# Patient Record
Sex: FEMALE | Race: WHITE | NOT HISPANIC OR LATINO | Employment: FULL TIME | URBAN - METROPOLITAN AREA
[De-identification: names, ages, dates, MRNs, and addresses within clinical notes are randomized per-mention and may not be internally consistent; named-entity substitution may affect disease eponyms.]

---

## 2022-04-21 LAB
EXTERNAL HIV CONFIRMATION: NORMAL
EXTERNAL HIV SCREEN: NORMAL
HCV AB SER-ACNC: NONREACTIVE

## 2022-08-18 ENCOUNTER — OCCMED (OUTPATIENT)
Dept: URGENT CARE | Facility: CLINIC | Age: 22
End: 2022-08-18

## 2022-08-18 ENCOUNTER — APPOINTMENT (OUTPATIENT)
Dept: LAB | Facility: CLINIC | Age: 22
End: 2022-08-18

## 2022-08-18 DIAGNOSIS — Z00.00 ENCOUNTER FOR PHYSICAL EXAMINATION: ICD-10-CM

## 2022-08-18 DIAGNOSIS — Z00.00 ENCOUNTER FOR PHYSICAL EXAMINATION: Primary | ICD-10-CM

## 2022-08-18 LAB — RUBV IGG SERPL IA-ACNC: 53.6 IU/ML

## 2022-08-18 PROCEDURE — 86735 MUMPS ANTIBODY: CPT

## 2022-08-18 PROCEDURE — 36415 COLL VENOUS BLD VENIPUNCTURE: CPT

## 2022-08-18 PROCEDURE — 86480 TB TEST CELL IMMUN MEASURE: CPT

## 2022-08-18 PROCEDURE — 86765 RUBEOLA ANTIBODY: CPT

## 2022-08-18 PROCEDURE — 86787 VARICELLA-ZOSTER ANTIBODY: CPT

## 2022-08-18 PROCEDURE — 86762 RUBELLA ANTIBODY: CPT

## 2022-08-19 LAB
MEV IGG SER QL IA: NORMAL
MUV IGG SER QL IA: NORMAL
VZV IGG SER QL IA: NORMAL

## 2022-08-21 LAB
GAMMA INTERFERON BACKGROUND BLD IA-ACNC: 0.04 IU/ML
M TB IFN-G BLD-IMP: NEGATIVE
M TB IFN-G CD4+ BCKGRND COR BLD-ACNC: 0 IU/ML
M TB IFN-G CD4+ BCKGRND COR BLD-ACNC: 0 IU/ML
MITOGEN IGNF BCKGRD COR BLD-ACNC: >10 IU/ML

## 2022-12-21 ENCOUNTER — OFFICE VISIT (OUTPATIENT)
Dept: FAMILY MEDICINE CLINIC | Facility: CLINIC | Age: 22
End: 2022-12-21

## 2022-12-21 VITALS
WEIGHT: 119 LBS | HEIGHT: 62 IN | SYSTOLIC BLOOD PRESSURE: 118 MMHG | HEART RATE: 73 BPM | RESPIRATION RATE: 14 BRPM | BODY MASS INDEX: 21.9 KG/M2 | DIASTOLIC BLOOD PRESSURE: 74 MMHG | TEMPERATURE: 98.7 F

## 2022-12-21 DIAGNOSIS — Z00.00 ANNUAL PHYSICAL EXAM: Primary | ICD-10-CM

## 2022-12-21 DIAGNOSIS — F41.9 ANXIETY: ICD-10-CM

## 2022-12-21 DIAGNOSIS — F32.A MILD DEPRESSION: ICD-10-CM

## 2022-12-21 PROBLEM — Z78.9 VEGAN DIET: Status: ACTIVE | Noted: 2022-12-21

## 2022-12-21 RX ORDER — IBUPROFEN 200 MG
400 TABLET ORAL
COMMUNITY

## 2022-12-21 RX ORDER — ETONOGESTREL 68 MG/1
68 IMPLANT SUBCUTANEOUS
COMMUNITY

## 2022-12-21 RX ORDER — ESCITALOPRAM OXALATE 10 MG/1
10 TABLET ORAL DAILY
Qty: 90 TABLET | Refills: 1 | Status: SHIPPED | OUTPATIENT
Start: 2022-12-21

## 2022-12-21 RX ORDER — CEPHALEXIN 500 MG/1
500 CAPSULE ORAL
COMMUNITY
Start: 2022-07-29 | End: 2022-12-21 | Stop reason: ALTCHOICE

## 2022-12-21 RX ORDER — MELOXICAM 7.5 MG/1
7.5 TABLET ORAL
COMMUNITY
Start: 2022-08-02 | End: 2022-12-21 | Stop reason: ALTCHOICE

## 2022-12-21 NOTE — ASSESSMENT & PLAN NOTE
Has history   Currently on zoloft; feels this is not helping her overall anxiety symptoms  NO SI  Continue therapy  Med changed as above

## 2022-12-21 NOTE — PROGRESS NOTES
[facility] PRIMARY CARE    NAME: Svetlana Alston  AGE: 20 y o  SEX: female  : 2000     DATE: 2022     Assessment and Plan:     Problem List Items Addressed This Visit        Other    Anxiety     Does see a therapist currently  She was on zoloft but feels this isnt working anymore          Relevant Medications    escitalopram (Lexapro) 10 mg tablet    Mild depression     Has history   Currently on zoloft; feels this is not helping her overall anxiety symptoms  NO SI  Continue therapy  Med changed as above  Relevant Medications    escitalopram (Lexapro) 10 mg tablet   Other Visit Diagnoses     Annual physical exam    -  Primary          Immunizations and preventive care screenings were discussed with patient today  Appropriate education was printed on patient's after visit summary  Counseling:  Alcohol/drug use: discussed moderation in alcohol intake, the recommendations for healthy alcohol use, and avoidance of illicit drug use  Dental Health: discussed importance of regular tooth brushing, flossing, and dental visits  Injury prevention: discussed safety/seat belts, safety helmets, smoke detectors, carbon dioxide detectors, and smoking near bedding or upholstery  Sexual health: discussed sexually transmitted diseases, partner selection, use of condoms, avoidance of unintended pregnancy, and contraceptive alternatives  Exercise: the importance of regular exercise/physical activity was discussed  Recommend exercise 3-5 times per week for at least 30 minutes  Return in about 6 weeks (around 2023) for Anxiety  Chief Complaint:     Chief Complaint   Patient presents with   • Physical Exam     Patient in office for annual check up and establish care  Patient will like to discuss dose change for the zoloft medication   Patient reports original dose was 100mg , patient decrease dose to 75mg due to side effect sxs of nausea and vomiting  History of Present Illness:     Adult Annual Physical   Patient here for a comprehensive physical exam  The patient reports problems - zoloft medication  was on to high of a dose so she decreased down to 75mg  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  She was hired in September enjoys her job     She has been on Zoloft for about 2 years  She was taking 75mg and then was recently increased to 100 in July and she was having side effects with nausea and vomiting  She had blood work done no other cause  Was still taking it for a month  She moved and didn't follow up and she reduced her dose down to 75mg  She states she also doesn't feel the 76 is working either  She was being seen at [facility] in [place]  She is in therapy as well  Diet and Physical Activity  Diet/Nutrition: well balanced diet and she is vegan      Exercise: 3-4 times a week on average, 5-7 times a week on average, 30-60 minutes on average and 1-2 hours on average  Depression Screening  PHQ-2/9 Depression Screening    Little interest or pleasure in doing things: 2 - more than half the days  Feeling down, depressed, or hopeless: 1 - several days  Trouble falling or staying asleep, or sleeping too much: 0 - not at all  Feeling tired or having little energy: 2 - more than half the days  Poor appetite or overeatin - not at all  Feeling bad about yourself - or that you are a failure or have let yourself or your family down: 0 - not at all  Trouble concentrating on things, such as reading the newspaper or watching television: 0 - not at all  Moving or speaking so slowly that other people could have noticed   Or the opposite - being so fidgety or restless that you have been moving around a lot more than usual: 3 - nearly every day  Thoughts that you would be better off dead, or of hurting yourself in some way: 0 - not at all  PHQ-2 Score: 3  PHQ-2 Interpretation: POSITIVE depression screen  PHQ-9 Score: 8   PHQ-9 Interpretation: Mild depression        General Health  Sleep: sleeps well  Hearing: normal - bilateral   Vision: goes for regular eye exams, most recent eye exam <1 year ago, wears glasses and wears contacts  Dental: regular dental visits and brushes teeth twice daily  /GYN Health  Patient is: premenopausal  Last menstrual period: 12/21/2022  Contraceptive method: nexplanonon        Review of Systems:     Review of Systems   Constitutional: Negative for appetite change  HENT: Negative for hearing loss  Eyes: Negative for visual disturbance  Respiratory: Negative for chest tightness and shortness of breath  Cardiovascular: Negative for chest pain and palpitations  Gastrointestinal: Positive for nausea and vomiting  Genitourinary: Negative for menstrual problem (cycle is long has BC)  Neurological: Negative for dizziness, light-headedness and headaches  Psychiatric/Behavioral: Positive for dysphoric mood (mild)  Negative for decreased concentration, sleep disturbance and suicidal ideas  The patient is nervous/anxious  Past Medical History:     History reviewed  No pertinent past medical history  Past Surgical History:     History reviewed  No pertinent surgical history  Social History:     Social History     Socioeconomic History   • Marital status: Single     Spouse name: None   • Number of children: None   • Years of education: None   • Highest education level: None   Occupational History   • None   Tobacco Use   • Smoking status: Never   • Smokeless tobacco: Never   Substance and Sexual Activity   • Alcohol use:  Yes     Alcohol/week: 1 0 standard drink     Types: 1 Glasses of wine per week     Comment: Socially   • Drug use: Yes     Types: Marijuana     Comment: Medicinal   • Sexual activity: Not Currently   Other Topics Concern   • None   Social History Narrative   • None     Social Determinants of Health     Financial Resource Strain: Not on file   Food Insecurity: Not on file   Transportation Needs: Not on file   Physical Activity: Not on file   Stress: Not on file   Social Connections: Not on file   Intimate Partner Violence: Not on file   Housing Stability: Not on file      Family History:     History reviewed  No pertinent family history  Current Medications:     Current Outpatient Medications   Medication Sig Dispense Refill   • B COMPLEX VITAMINS PO      • Cholecalciferol 25 MCG (1000 UT) capsule Take 1,000 Units by mouth     • escitalopram (Lexapro) 10 mg tablet Take 1 tablet (10 mg total) by mouth daily 90 tablet 1   • etonogestrel (Nexplanon) subdermal implant Inject 68 mg under the skin     • ibuprofen (MOTRIN) 200 mg tablet Take 400 mg by mouth       No current facility-administered medications for this visit  Allergies:     No Known Allergies   Physical Exam:     /74 (BP Location: Right arm, Patient Position: Sitting, Cuff Size: Adult)   Pulse 73   Temp 98 7 °F (37 1 °C) (Temporal)   Resp 14   Ht 5' 2" (1 575 m)   Wt 54 kg (119 lb)   LMP 12/21/2022   BMI 21 77 kg/m²     Physical Exam  Vitals and nursing note reviewed  Constitutional:       General: She is not in acute distress  Appearance: Normal appearance  She is well-developed  She is not ill-appearing, toxic-appearing or diaphoretic  HENT:      Head: Normocephalic and atraumatic  Right Ear: Tympanic membrane and external ear normal       Left Ear: Tympanic membrane and external ear normal       Nose: Nose normal       Mouth/Throat:      Mouth: Mucous membranes are moist       Pharynx: No oropharyngeal exudate or posterior oropharyngeal erythema  Eyes:      Extraocular Movements: Extraocular movements intact  Conjunctiva/sclera: Conjunctivae normal       Pupils: Pupils are equal, round, and reactive to light  Neck:      Thyroid: No thyromegaly  Vascular: No carotid bruit or JVD     Cardiovascular:      Rate and Rhythm: Normal rate and regular rhythm  Pulses:           Carotid pulses are 2+ on the right side and 2+ on the left side  Heart sounds: Normal heart sounds, S1 normal and S2 normal  No murmur heard  Pulmonary:      Effort: Pulmonary effort is normal       Breath sounds: Normal breath sounds  Abdominal:      General: Bowel sounds are normal       Palpations: Abdomen is soft  Tenderness: There is no abdominal tenderness  Musculoskeletal:         General: Normal range of motion  Cervical back: Normal range of motion  Right lower leg: No edema  Left lower leg: No edema  Lymphadenopathy:      Cervical: No cervical adenopathy  Skin:     General: Skin is warm  Capillary Refill: Capillary refill takes less than 2 seconds  Neurological:      Mental Status: She is alert and oriented to person, place, and time  Motor: Motor function is intact  Coordination: Coordination is intact  Gait: Gait is intact  Psychiatric:         Mood and Affect: Mood normal          Behavior: Behavior normal          Thought Content: Thought content normal          Judgment: Judgment normal           Depression Screening Follow-up Plan: Patient's depression screening was positive with a PHQ-2 score of 3  Their PHQ-9 score was 8  Continue regular follow-up with their psychologist/therapist/psychiatrist who is managing their mental health condition(s)        KENYATTA Rivas  [facility] PRIMARY CARE

## 2022-12-22 ENCOUNTER — TELEPHONE (OUTPATIENT)
Dept: ADMINISTRATIVE | Facility: OTHER | Age: 22
End: 2022-12-22

## 2022-12-22 NOTE — TELEPHONE ENCOUNTER
----- Message from Lenore ESPARZA sent at 12/21/2022  5:31 PM EST -----  Regarding: care gap request  12/21/22 5:31 PM    Hello, our patient attached above has had Pap Smear (HPV) aka Cervical Cancer Screening completed/performed  Please assist in updating the patient chart by pulling the Care Everywhere (CE) document  The date of service is June 2022       Thank you,    ISAIAS GEORGE   [facility]

## 2022-12-22 NOTE — TELEPHONE ENCOUNTER
Upon review of the In Basket request we were able to locate, review, and update the patient chart as requested for Pap Smear (HPV) aka Cervical Cancer Screening  Any additional questions or concerns should be emailed to the Practice Liaisons via the appropriate education email address, please do not reply via In Basket      Thank you  German Garcia

## 2022-12-30 ENCOUNTER — NEW PATIENT (OUTPATIENT)
Dept: URBAN - METROPOLITAN AREA CLINIC 6 | Facility: CLINIC | Age: 22
End: 2022-12-30

## 2022-12-30 DIAGNOSIS — Z01.00: ICD-10-CM

## 2022-12-30 PROCEDURE — 92004 COMPRE OPH EXAM NEW PT 1/>: CPT

## 2022-12-30 PROCEDURE — 92015 DETERMINE REFRACTIVE STATE: CPT

## 2022-12-30 ASSESSMENT — TONOMETRY
OD_IOP_MMHG: 20
OS_IOP_MMHG: 19

## 2022-12-30 ASSESSMENT — VISUAL ACUITY
OD_CC: 20/25
OS_CC: 20/25

## 2023-01-09 ENCOUNTER — PATIENT MESSAGE (OUTPATIENT)
Dept: FAMILY MEDICINE CLINIC | Facility: CLINIC | Age: 23
End: 2023-01-09

## 2023-01-09 DIAGNOSIS — A64 STI (SEXUALLY TRANSMITTED INFECTION): Primary | ICD-10-CM

## 2023-01-12 NOTE — TELEPHONE ENCOUNTER
From: Paul Wade  To: Gurwinder Veras  Sent: 1/9/2023 4:30 PM EST  Subject: STD/STI    Good afternoon! On Friday I started getting symptoms I thought were from an STI  I had recently had sex with someone who I know has herpes II and I went and got labwork done through [facility] that came back as normal today  I have thick cottage cheese like discharge and some painful blisters where my pubic hair is, but no other symptoms  I did not get tested for any other STDs   I was going to go get the other labs done at [facility], but do not have anything available until the 17th and I do not have a GYN appt until the 31st

## 2023-01-31 ENCOUNTER — OFFICE VISIT (OUTPATIENT)
Dept: OBGYN CLINIC | Facility: CLINIC | Age: 23
End: 2023-01-31

## 2023-01-31 VITALS
SYSTOLIC BLOOD PRESSURE: 104 MMHG | WEIGHT: 118.6 LBS | BODY MASS INDEX: 21.83 KG/M2 | DIASTOLIC BLOOD PRESSURE: 76 MMHG | HEIGHT: 62 IN

## 2023-01-31 DIAGNOSIS — Z30.09 GENERAL COUNSELING AND ADVICE FOR CONTRACEPTIVE MANAGEMENT: Primary | ICD-10-CM

## 2023-02-02 NOTE — PROGRESS NOTES
This is a 50-year-old white female with no current GYN issues  She had a yearly exam last year that was normal   The exam was in June 2022  Pap smear was performed and was negative  Her current method of contraception includes Nexplanon implant  This was placed in 2021  She wanted me to physician understand philosophy  All questions were answered  Exam was not performed  Her vital signs were reviewed  We spent approximately 7 minutes talking to the patient about ofloxacin who we are etc   She was pleased with that  She will make an appointment to see you sometime in June of this year for yearly exam if she is still in the area

## 2023-02-02 NOTE — PATIENT INSTRUCTIONS
Had a discussion about her method of contraception occluding Nexplanon  General questions were answered no exam was done  Her visual exams to be done in June of this year  She may decide to follow-up with she will let us know  We spent approximately 7 minutes talking to this patient

## 2023-02-08 ENCOUNTER — OFFICE VISIT (OUTPATIENT)
Dept: FAMILY MEDICINE CLINIC | Facility: CLINIC | Age: 23
End: 2023-02-08

## 2023-02-08 VITALS
RESPIRATION RATE: 14 BRPM | TEMPERATURE: 98.4 F | DIASTOLIC BLOOD PRESSURE: 68 MMHG | WEIGHT: 118.8 LBS | BODY MASS INDEX: 21.86 KG/M2 | SYSTOLIC BLOOD PRESSURE: 104 MMHG | HEART RATE: 71 BPM | HEIGHT: 62 IN

## 2023-02-08 DIAGNOSIS — F41.9 ANXIETY: Primary | ICD-10-CM

## 2023-02-08 DIAGNOSIS — F32.A MILD DEPRESSION: ICD-10-CM

## 2023-02-08 RX ORDER — ESCITALOPRAM OXALATE 10 MG/1
10 TABLET ORAL DAILY
Qty: 90 TABLET | Refills: 1 | Status: SHIPPED | OUTPATIENT
Start: 2023-02-08

## 2023-02-08 NOTE — PROGRESS NOTES
Name: Jennifer Louis      : 2000      MRN: [id number]  Encounter Provider: KENYATTA Purdy  Encounter Date: 2023   Encounter department: [facility] PRIMARY CARE    Assessment & Plan     1  Anxiety  Assessment & Plan:  Patient doing very well with lexapro continue this  Will refer to psych to discuss possible ADHD diagnosis     Orders:  -     Ambulatory Referral to Psychiatry; Future  -     escitalopram (Lexapro) 10 mg tablet; Take 1 tablet (10 mg total) by mouth daily    2  Mild depression  -     Ambulatory Referral to Psychiatry; Future  -     escitalopram (Lexapro) 10 mg tablet; Take 1 tablet (10 mg total) by mouth daily           Subjective      Patient presents today for 6 week followup  She had some mild nausea for a few day but not as severe as she had with zoloft  She is feels her anxiety is stable  She is not feeling the brain fog she was having  Patient has been working with her therapist and told her to screen for adhd  They have been working on answering some questions and she is relating to some of the behaviors identified  Review of Systems   Constitutional: Negative for diaphoresis and fatigue  Gastrointestinal: Positive for nausea  Psychiatric/Behavioral: Positive for decreased concentration  Negative for dysphoric mood, sleep disturbance and suicidal ideas  The patient is not nervous/anxious          Current Outpatient Medications on File Prior to Visit   Medication Sig   • B COMPLEX VITAMINS PO    • Cholecalciferol 25 MCG (1000 UT) capsule Take 1,000 Units by mouth   • etonogestrel (Nexplanon) subdermal implant Inject 68 mg under the skin   • ibuprofen (MOTRIN) 200 mg tablet Take 400 mg by mouth   • [DISCONTINUED] escitalopram (Lexapro) 10 mg tablet Take 1 tablet (10 mg total) by mouth daily       Objective     /68 (BP Location: Left arm, Patient Position: Sitting, Cuff Size: Adult)   Pulse 71   Temp 98 4 °F (36 9 °C) (Temporal)   Resp 14   Ht 5' 2" (1 575 m)   Wt 53 9 kg (118 lb 12 8 oz)   LMP 01/16/2023 (Exact Date)   BMI 21 73 kg/m²     Physical Exam  Vitals and nursing note reviewed  Constitutional:       Appearance: Normal appearance  She is well-developed  She is not ill-appearing  HENT:      Head: Normocephalic and atraumatic  Eyes:      Extraocular Movements: Extraocular movements intact  Conjunctiva/sclera: Conjunctivae normal       Pupils: Pupils are equal, round, and reactive to light  Cardiovascular:      Rate and Rhythm: Normal rate and regular rhythm  Heart sounds: Normal heart sounds, S1 normal and S2 normal    Pulmonary:      Effort: Pulmonary effort is normal       Breath sounds: Normal breath sounds  Musculoskeletal:      Right lower leg: No edema  Left lower leg: No edema  Neurological:      Mental Status: She is alert and oriented to person, place, and time  Psychiatric:         Mood and Affect: Mood normal          Behavior: Behavior normal          Thought Content:  Thought content normal          Judgment: Judgment normal        Edwyna Mins, CRNP

## 2023-02-08 NOTE — ASSESSMENT & PLAN NOTE
Patient doing very well with lexapro continue this     Will refer to psych to discuss possible ADHD diagnosis

## 2023-02-24 ENCOUNTER — TELEPHONE (OUTPATIENT)
Dept: PSYCHIATRY | Facility: CLINIC | Age: 23
End: 2023-02-24

## 2023-02-24 NOTE — TELEPHONE ENCOUNTER
I was unable to leave a VM for pt to return call to intake in regards to referral for psychiatry because  is full at this time

## 2023-03-07 ENCOUNTER — TELEPHONE (OUTPATIENT)
Dept: PSYCHIATRY | Facility: CLINIC | Age: 23
End: 2023-03-07

## 2023-06-06 ENCOUNTER — ANNUAL EXAM (OUTPATIENT)
Dept: OBGYN CLINIC | Facility: CLINIC | Age: 23
End: 2023-06-06
Payer: COMMERCIAL

## 2023-06-06 VITALS
BODY MASS INDEX: 22.08 KG/M2 | HEIGHT: 62 IN | SYSTOLIC BLOOD PRESSURE: 108 MMHG | DIASTOLIC BLOOD PRESSURE: 72 MMHG | WEIGHT: 120 LBS

## 2023-06-06 DIAGNOSIS — Z11.3 SCREENING FOR STDS (SEXUALLY TRANSMITTED DISEASES): ICD-10-CM

## 2023-06-06 DIAGNOSIS — Z01.419 WOMEN'S ANNUAL ROUTINE GYNECOLOGICAL EXAMINATION: Primary | ICD-10-CM

## 2023-06-06 PROCEDURE — 87491 CHLMYD TRACH DNA AMP PROBE: CPT | Performed by: OBSTETRICS & GYNECOLOGY

## 2023-06-06 PROCEDURE — S0612 ANNUAL GYNECOLOGICAL EXAMINA: HCPCS | Performed by: OBSTETRICS & GYNECOLOGY

## 2023-06-06 PROCEDURE — 87591 N.GONORRHOEAE DNA AMP PROB: CPT | Performed by: OBSTETRICS & GYNECOLOGY

## 2023-06-06 PROCEDURE — G0145 SCR C/V CYTO,THINLAYER,RESCR: HCPCS | Performed by: OBSTETRICS & GYNECOLOGY

## 2023-06-06 NOTE — PROGRESS NOTES
"Assessment/Plan:    Patient was informed of a stable GYN examination  A Pap smear was performed  She is happy with the method of contraception including Nexplanon  She is not in relation at the present time  She will use condoms with a new relationship  She feels safe at home  She sees a dentist on a regular basis  She is content with her weight  Does have a history of depression under control on Lexapro  He should return to my office on a yearly basis unless new issues or problems occur  Subjective:      Patient ID: Kellie Lutz is a 24 y o  female  HPI    This is a 67-year-old white female, she is nulliparous  She is here today for yearly exam   Her current method of contraception \"Nexplanon this was  inserted in the year 2021  She is happy with this method  She is no longer relation for the last 2 months  She will use condoms as a backup if any new relationship starts  She is content with her weight  She feels safe at home  She does have a history of anxiety and depression  Currently she been treated with Lexapro which is working very very well  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  There are no new major family illnesses to report  She does have a family history positive for paternal grandmother with breast cancer  Denies any major  or GI complaint  She declines STD screening  She will have a Pap smear today  She sees a dentist on a regular basis  She is content with her weight  She is feels safe at home  The following portions of the patient's history were reviewed and updated as appropriate: allergies, current medications, past family history, past medical history, past social history, past surgical history and problem list     Review of Systems   All other systems reviewed and are negative          Objective:      /72   Ht 5' 2\" (1 575 m)   Wt 54 4 kg (120 lb)   LMP 06/02/2023 (Exact Date)   BMI 21 95 kg/m² " Physical Exam  Vitals reviewed  Exam conducted with a chaperone present  Constitutional:       Appearance: She is normal weight  HENT:      Head: Normocephalic and atraumatic  Nose: Nose normal       Mouth/Throat:      Mouth: Mucous membranes are moist    Eyes:      Extraocular Movements: Extraocular movements intact  Pupils: Pupils are equal, round, and reactive to light  Cardiovascular:      Rate and Rhythm: Normal rate and regular rhythm  Pulses: Normal pulses  Heart sounds: Normal heart sounds  Pulmonary:      Effort: Pulmonary effort is normal       Breath sounds: Normal breath sounds  Chest:   Breasts:     Right: Normal       Left: Normal    Abdominal:      General: Abdomen is flat  Bowel sounds are normal  There is no distension  Palpations: Abdomen is soft  There is no hepatomegaly, splenomegaly or mass  Tenderness: There is no abdominal tenderness  Hernia: No hernia is present  There is no hernia in the umbilical area, ventral area, left inguinal area or right inguinal area  Genitourinary:     General: Normal vulva  Pubic Area: No rash or pubic lice  Labia:         Right: No rash, tenderness, lesion or injury  Left: No rash, tenderness, lesion or injury  Urethra: No prolapse, urethral pain, urethral swelling or urethral lesion  Vagina: Normal  No signs of injury and foreign body  No vaginal discharge, erythema, tenderness or bleeding  Cervix: Normal       Uterus: Normal  Not deviated, not enlarged, not fixed and not tender  Adnexa: Right adnexa normal and left adnexa normal         Right: No mass or tenderness  Left: No mass or tenderness  Rectum: Normal       Comments: The external genitalia within normal limits, the vagina is clean  Cervix is nulliparous  Uterus is small mobile nontender adnexa clear bilaterally  There is no evidence of prolapse    The bladder and urethra are normal working relationship  A Pap smear was performed  Musculoskeletal:         General: Normal range of motion  Cervical back: Normal range of motion and neck supple  Lymphadenopathy:      Upper Body:      Right upper body: No supraclavicular adenopathy  Left upper body: No supraclavicular adenopathy  Lower Body: No right inguinal adenopathy  No left inguinal adenopathy  Skin:     General: Skin is warm and dry  Neurological:      General: No focal deficit present  Mental Status: She is alert and oriented to person, place, and time     Psychiatric:         Mood and Affect: Mood normal          Behavior: Behavior normal

## 2023-06-06 NOTE — PATIENT INSTRUCTIONS
The patient was informed of a stable GYN examination  A Pap smear was performed  She is happy with the method of contraception  She will should return to my office in 1 year unless new issues occur or to change her Pap smear

## 2023-06-08 LAB
C TRACH DNA SPEC QL NAA+PROBE: POSITIVE
N GONORRHOEA DNA SPEC QL NAA+PROBE: NEGATIVE

## 2023-06-09 ENCOUNTER — TELEPHONE (OUTPATIENT)
Dept: OBGYN CLINIC | Facility: CLINIC | Age: 23
End: 2023-06-09

## 2023-06-09 DIAGNOSIS — F41.9 ANXIETY: ICD-10-CM

## 2023-06-09 DIAGNOSIS — F32.A MILD DEPRESSION: ICD-10-CM

## 2023-06-09 NOTE — TELEPHONE ENCOUNTER
Patient viewing results in My Chart  (+) chlamydia culture results 6/6/2023  She had not had intercourse in 2 months but has informed previous partner  She will be leaving to go out of country on 6/13/2023 & prefers to start medication now  Informed message sent to JSW re: treatment & will update patient today or 6/12/2023  Patient also aware needs MALISSA culture after tx

## 2023-06-10 DIAGNOSIS — A74.9 CHLAMYDIA INFECTION: Primary | ICD-10-CM

## 2023-06-10 RX ORDER — AZITHROMYCIN 500 MG/1
TABLET, FILM COATED ORAL
Qty: 2 TABLET | Refills: 1 | Status: SHIPPED | OUTPATIENT
Start: 2023-06-10 | End: 2023-06-10

## 2023-06-10 NOTE — PROGRESS NOTES
The patient was left a message that she was positive for chlamydia on her phone  We were unable to reach her by email she does not have her email address  She should take 2 tablets of azithromycin tonight  I would suggest she refrain from sexual activity she has a test of cure  She is going away on vacation  She should return to my office in 2 weeks for test of cure

## 2023-06-12 LAB
LAB AP GYN PRIMARY INTERPRETATION: NORMAL
Lab: NORMAL

## 2023-06-12 RX ORDER — ESCITALOPRAM OXALATE 10 MG/1
10 TABLET ORAL DAILY
Qty: 90 TABLET | Refills: 0 | Status: SHIPPED | OUTPATIENT
Start: 2023-06-12

## 2023-06-12 NOTE — TELEPHONE ENCOUNTER
Lm patient's as re: needs MALISSA culture in 3-4 wks - JSW spoke with patient on 6/10/2023 & prescribed Azithromycin

## 2023-07-12 ENCOUNTER — OFFICE VISIT (OUTPATIENT)
Dept: FAMILY MEDICINE CLINIC | Facility: CLINIC | Age: 23
End: 2023-07-12
Payer: COMMERCIAL

## 2023-07-12 VITALS
HEART RATE: 54 BPM | OXYGEN SATURATION: 100 % | WEIGHT: 120 LBS | DIASTOLIC BLOOD PRESSURE: 80 MMHG | BODY MASS INDEX: 22.08 KG/M2 | SYSTOLIC BLOOD PRESSURE: 120 MMHG | HEIGHT: 62 IN

## 2023-07-12 DIAGNOSIS — F41.9 ANXIETY: Primary | ICD-10-CM

## 2023-07-12 DIAGNOSIS — F32.A MILD DEPRESSION: ICD-10-CM

## 2023-07-12 PROCEDURE — 99214 OFFICE O/P EST MOD 30 MIN: CPT | Performed by: NURSE PRACTITIONER

## 2023-07-12 NOTE — PROGRESS NOTES
Name: Larry Ralph      : 2000      MRN: [id number]  Encounter Provider: KENYATTA Pena  Encounter Date: 2023   Encounter department: [facility] PRIMARY CARE    Assessment & Plan     1. Anxiety  Assessment & Plan:  Patient doing very well on medication. Continue lexapro 10mg       2. Mild depression  Assessment & Plan:  doing very well on lexapro. No HI or SI             Subjective      patient presents today for follow up on her anixety. she was started on lexapro and was doing well with medication   She is not having any side effects and feels like medication is still really helping. Review of Systems   Constitutional: Negative. Respiratory: Negative. Cardiovascular: Negative. Gastrointestinal: Negative for nausea. Psychiatric/Behavioral: Negative for dysphoric mood, sleep disturbance and suicidal ideas. The patient is not nervous/anxious. Current Outpatient Medications on File Prior to Visit   Medication Sig   • B COMPLEX VITAMINS PO    • Cholecalciferol 25 MCG (1000 UT) capsule Take 1,000 Units by mouth   • escitalopram (Lexapro) 10 mg tablet Take 1 tablet (10 mg total) by mouth daily   • etonogestrel (Nexplanon) subdermal implant Inject 68 mg under the skin   • ibuprofen (MOTRIN) 200 mg tablet Take 400 mg by mouth (Patient not taking: Reported on 2023)       Objective     /80 (BP Location: Left arm, Patient Position: Sitting, Cuff Size: Standard)   Pulse (!) 54   Ht 5' 2" (1.575 m)   Wt 54.4 kg (120 lb)   SpO2 100%   BMI 21.95 kg/m²     Physical Exam  Vitals and nursing note reviewed. Constitutional:       Appearance: Normal appearance. She is well-developed. HENT:      Head: Normocephalic and atraumatic. Eyes:      Extraocular Movements: Extraocular movements intact. Conjunctiva/sclera: Conjunctivae normal.      Pupils: Pupils are equal, round, and reactive to light.    Cardiovascular:      Rate and Rhythm: Normal rate and regular rhythm. Heart sounds: Normal heart sounds, S1 normal and S2 normal.   Pulmonary:      Effort: Pulmonary effort is normal.      Breath sounds: Normal breath sounds. Musculoskeletal:      Right lower leg: No edema. Left lower leg: No edema. Neurological:      Mental Status: She is alert and oriented to person, place, and time. Psychiatric:         Mood and Affect: Mood normal.         Behavior: Behavior normal.         Thought Content: Thought content normal. Thought content does not include homicidal or suicidal ideation. Thought content does not include suicidal plan.          Judgment: Judgment normal.       KENYATTA Paniagua

## 2023-07-13 ENCOUNTER — OFFICE VISIT (OUTPATIENT)
Dept: OBGYN CLINIC | Facility: CLINIC | Age: 23
End: 2023-07-13
Payer: COMMERCIAL

## 2023-07-13 VITALS
BODY MASS INDEX: 21.9 KG/M2 | HEIGHT: 62 IN | DIASTOLIC BLOOD PRESSURE: 76 MMHG | WEIGHT: 119 LBS | SYSTOLIC BLOOD PRESSURE: 122 MMHG

## 2023-07-13 DIAGNOSIS — Z86.19 HISTORY OF CHLAMYDIA: Primary | ICD-10-CM

## 2023-07-13 PROCEDURE — 87591 N.GONORRHOEAE DNA AMP PROB: CPT | Performed by: OBSTETRICS & GYNECOLOGY

## 2023-07-13 PROCEDURE — 87491 CHLMYD TRACH DNA AMP PROBE: CPT | Performed by: OBSTETRICS & GYNECOLOGY

## 2023-07-13 PROCEDURE — 99212 OFFICE O/P EST SF 10 MIN: CPT | Performed by: OBSTETRICS & GYNECOLOGY

## 2023-07-13 NOTE — PROGRESS NOTES
This is a 74-year-old female who had a positive culture for chlamydia screening she was treated with azithromycin. She has no longer had contact with her partner. She is not sure if he was treated. She returns to my office today for test of cure. Denies any symptomatology or issues. She was reminded to use condoms in all future endeavors until she really knows her new partners. She will keep me informed of her progress. If the culture is positive we will treat again with azithromycin.

## 2023-07-13 NOTE — PATIENT INSTRUCTIONS
The patient was seen today for test of cure after being treated azithromycin for positive chlamydia. She is asymptomatic. She will be informed results when return. She will use condoms with all future endeavors. She is no longer involved with a former partner.

## 2023-07-14 LAB
C TRACH DNA SPEC QL NAA+PROBE: NEGATIVE
N GONORRHOEA DNA SPEC QL NAA+PROBE: NEGATIVE

## 2023-07-17 ENCOUNTER — TELEPHONE (OUTPATIENT)
Dept: OBGYN CLINIC | Facility: CLINIC | Age: 23
End: 2023-07-17

## 2023-07-17 NOTE — TELEPHONE ENCOUNTER
The patient was left a message on her phone that her recent chlamydia culture was negative. She should call me if she has any further questions.

## 2023-09-04 DIAGNOSIS — F32.A MILD DEPRESSION: ICD-10-CM

## 2023-09-04 DIAGNOSIS — F41.9 ANXIETY: ICD-10-CM

## 2023-09-05 RX ORDER — ESCITALOPRAM OXALATE 10 MG/1
10 TABLET ORAL DAILY
Qty: 90 TABLET | Refills: 1 | Status: SHIPPED | OUTPATIENT
Start: 2023-09-05

## 2023-09-13 ENCOUNTER — APPOINTMENT (OUTPATIENT)
Dept: RADIOLOGY | Facility: MEDICAL CENTER | Age: 23
End: 2023-09-13
Payer: COMMERCIAL

## 2023-09-13 ENCOUNTER — OFFICE VISIT (OUTPATIENT)
Dept: OBGYN CLINIC | Facility: MEDICAL CENTER | Age: 23
End: 2023-09-13
Payer: COMMERCIAL

## 2023-09-13 VITALS
HEART RATE: 68 BPM | DIASTOLIC BLOOD PRESSURE: 72 MMHG | WEIGHT: 120 LBS | BODY MASS INDEX: 22.08 KG/M2 | SYSTOLIC BLOOD PRESSURE: 122 MMHG | HEIGHT: 62 IN

## 2023-09-13 DIAGNOSIS — Z01.89 ENCOUNTER FOR LOWER EXTREMITY COMPARISON IMAGING STUDY: ICD-10-CM

## 2023-09-13 DIAGNOSIS — M25.562 LEFT KNEE PAIN, UNSPECIFIED CHRONICITY: Primary | ICD-10-CM

## 2023-09-13 DIAGNOSIS — M25.562 LEFT KNEE PAIN, UNSPECIFIED CHRONICITY: ICD-10-CM

## 2023-09-13 PROCEDURE — 73564 X-RAY EXAM KNEE 4 OR MORE: CPT

## 2023-09-13 PROCEDURE — 99204 OFFICE O/P NEW MOD 45 MIN: CPT | Performed by: ORTHOPAEDIC SURGERY

## 2023-09-13 PROCEDURE — 73562 X-RAY EXAM OF KNEE 3: CPT

## 2023-09-13 NOTE — PROGRESS NOTES
Ortho Sports Medicine Knee New Patient Visit     Assesment:   24 y.o. female left knee pain with concern for medial vs lateral meniscus and previous ACL graft. Plan:      Conservative treatment:    · Ice to knee for 20 minutes at least 1-2 times daily. · OTC NSAIDS prn for pain. · Knee sleeve for swelling. · MRI of the left knee ordered to evaluate for medial vs. Lateral meniscus tear and to assess the graft from previous ACL surgery. Imaging: All imaging from today was reviewed by myself and explained to the patient. Injection:    No Injection planned at this time. Surgery:     No surgery is recommended at this point, continue with conservative treatment plan as noted. Follow up:    Return for Recheck with Dr. Edgard Clemente following left knee MRI. Chief Complaint   Patient presents with   • Left Knee - Pain       History of Present Illness: The patient is a 24 y.o. 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 Her pain is located in the anterior, lateral, medial and posterior knee. The patient rates her pain as dull and achy at a 5/10. The patient has a prior history of ACL reconstruction with hamstring autograft in 2013 and ACL reconstruction with BTB autograft and lateral meniscectomy in 2016. She denies an acute CHEMA. She presents today with a mild knee effusion that worsens during the day when on her feet while working. She wears a knee sleeve to help control swelling when working. She states her knee locks. She is active exercising often with walking, running, and yoga. Her left knee pain significantly limits these activities. She reports she is unable to obtain full knee ROM due to swelling. She denies any distal numbness or tingling. Knee Surgical History:  2016 - Left knee arthroscopy ACL reconstruction with BTB autograft and lateral meniscectomy.   2013 - Left knee arthroscopy ACL reconstruction with hamstring autograft. Past Medical, Social and Family History:  History reviewed. No pertinent past medical history. Past Surgical History:   Procedure Laterality Date   • KNEE ARTHROSCOPY W/ ACL RECONSTRUCTION Left     2013/ 2016   • WISDOM TOOTH EXTRACTION  2016     No Known Allergies  Current Outpatient Medications on File Prior to Visit   Medication Sig Dispense Refill   • B COMPLEX VITAMINS PO      • Cholecalciferol 25 MCG (1000 UT) capsule Take 1,000 Units by mouth     • escitalopram (LEXAPRO) 10 mg tablet take 1 tablet by mouth once daily 90 tablet 1   • etonogestrel (Nexplanon) subdermal implant Inject 68 mg under the skin     • ibuprofen (MOTRIN) 200 mg tablet Take 400 mg by mouth (Patient not taking: Reported on 6/6/2023)       No current facility-administered medications on file prior to visit. Social History     Socioeconomic History   • Marital status: Single     Spouse name: Not on file   • Number of children: Not on file   • Years of education: Not on file   • Highest education level: Not on file   Occupational History   • Not on file   Tobacco Use   • Smoking status: Never   • Smokeless tobacco: Never   Vaping Use   • Vaping Use: Never used   Substance and Sexual Activity   • Alcohol use:  Yes     Alcohol/week: 1.0 standard drink of alcohol     Types: 1 Glasses of wine per week     Comment: Socially   • Drug use: Yes     Types: Marijuana     Comment: Medicinal   • Sexual activity: Yes     Partners: Male     Birth control/protection: Implant, Condom Male     Comment: Nexplanon   Other Topics Concern   • Not on file   Social History Narrative   • Not on file     Social Determinants of Health     Financial Resource Strain: Not on file   Food Insecurity: Not on file   Transportation Needs: Not on file   Physical Activity: Not on file   Stress: Not on file   Social Connections: Not on file   Intimate Partner Violence: Not on file   Housing Stability: Not on file I have reviewed the past medical, surgical, social and family history, medications and allergies as documented in the EMR. Review of systems: ROS is negative other than that noted in the HPI. Constitutional: Negative for fatigue and fever. HENT: Negative for sore throat. Respiratory: Negative for shortness of breath. Cardiovascular: Negative for chest pain. Gastrointestinal: Negative for abdominal pain. Endocrine: Negative for cold intolerance and heat intolerance. Genitourinary: Negative for flank pain. Musculoskeletal: Negative for back pain. Skin: Negative for rash. Allergic/Immunologic: Negative for immunocompromised state. Neurological: Negative for dizziness. Psychiatric/Behavioral: Negative for agitation. Physical Exam:    Blood pressure 122/72, pulse 68, height 5' 2" (1.575 m), weight 54.4 kg (120 lb). General/Constitutional: NAD, well developed, well nourished  HENT: Normocephalic, atraumatic  CV: Intact distal pulses, regular rate  Resp: No respiratory distress or labored breathing  GI: Soft and non-tender   Lymphatic: No lymphadenopathy palpated  Neuro: Alert and Oriented x 3, no focal deficits  Psych: Normal mood, normal affect, normal judgement, normal behavior  Skin: Warm, dry, no rashes, no erythema      Knee Exam (focused): RIGHT LEFT   ROM:   0-130 0-110   Palpation: Effusion negative mild     MJL tenderness Negative Positive     LJL tenderness Negative Positive   Meniscus:  Julian Negative Positive    Apley's Compression Negative Not tested   Instability: Varus stable stable     Valgus stable stable   Special Tests: Lachman Negative Negative     Posterior drawer Negative Negative     Anterior drawer Negative Negative     Pivot shift not tested not tested     Dial not tested not tested   Patella: Palpation no tenderness no tenderness     Mobility 1/4 1/4     Apprehension Negative Negative   Other: Single leg 1/4 squat not tested not tested LE NV Exam: +2 DP/PT pulses bilaterally  Sensation intact to light touch L2-S1 bilaterally     Bilateral hip ROM demonstrates no pain actively or passively    No calf tenderness to palpation bilaterally    Knee Imaging    X-rays of the left knee were reviewed, which demonstrate surgical hardware from prior ACL reconstruction surgeries. No acute fracture or dislocation. Appropriate joint space. I have reviewed the radiology report and do not currently have a radiology reading from [facility], but will check the result once the reading is performed.     Scribe Attestation    I,:  Nancy Rodriguez am acting as a scribe while in the presence of the attending physician.:       I,:  Cecilia Montez, DO personally performed the services described in this documentation    as scribed in my presence.:

## 2023-09-20 ENCOUNTER — HOSPITAL ENCOUNTER (OUTPATIENT)
Dept: MRI IMAGING | Facility: HOSPITAL | Age: 23
Discharge: HOME | End: 2023-09-20
Attending: ORTHOPAEDIC SURGERY
Payer: COMMERCIAL

## 2023-09-20 DIAGNOSIS — M25.562 LEFT KNEE PAIN, UNSPECIFIED CHRONICITY: ICD-10-CM

## 2023-09-20 PROCEDURE — 73721 MRI JNT OF LWR EXTRE W/O DYE: CPT

## 2023-09-20 PROCEDURE — G1004 CDSM NDSC: HCPCS

## 2023-09-27 ENCOUNTER — OFFICE VISIT (OUTPATIENT)
Dept: OBGYN CLINIC | Facility: MEDICAL CENTER | Age: 23
End: 2023-09-27
Payer: COMMERCIAL

## 2023-09-27 VITALS
DIASTOLIC BLOOD PRESSURE: 73 MMHG | SYSTOLIC BLOOD PRESSURE: 118 MMHG | HEART RATE: 65 BPM | WEIGHT: 121 LBS | BODY MASS INDEX: 22.26 KG/M2 | HEIGHT: 62 IN

## 2023-09-27 DIAGNOSIS — M25.562 ACUTE PAIN OF LEFT KNEE: Primary | ICD-10-CM

## 2023-09-27 PROCEDURE — 99213 OFFICE O/P EST LOW 20 MIN: CPT | Performed by: ORTHOPAEDIC SURGERY

## 2023-09-27 NOTE — PROGRESS NOTES
Ortho Sports Medicine Knee Visit     Assesment:   left knee stiffness and pain, no acl graft tea    Plan:    Conservative treatment:    Ice to knee for 20 minutes at least 1-2 times daily. PT for ROM/strengthening to knee, hip and core. Imaging: All imaging from today was reviewed by myself and explained to the patient. Injection:    No Injection planned at this time. Surgery:     No surgery is recommended at this point, continue with conservative treatment plan as noted. History of Present Illness: The patient is returns for follow up of left knee. Since the prior visit, She reports minimal improvement. Pain is located anterior, posterior. Pain is improved by rest.  Pain is aggravated by weight bearing. Symptoms include swelling. The patient has tried rest, ice, NSAIDS and physical therapy. I have reviewed the past medical, surgical, social and family history, medications and allergies as documented in the EMR. Review of systems: ROS is negative other than that noted in the HPI. Constitutional: Negative for fatigue and fever. Cardiovascular: Negative for chest pain  Pulmonary: negative for shortness of breath    PMH/PSH:  No past medical history on file. Past Surgical History:   Procedure Laterality Date   • KNEE ARTHROSCOPY W/ ACL RECONSTRUCTION Left     2013/ 2016   • WISDOM TOOTH EXTRACTION  2016        Physical Exam:    Blood pressure 118/73, pulse 65, height 5' 2" (1.575 m), weight 54.9 kg (121 lb). General/Constitutional: NAD, well developed, well nourished  HENT: Normocephalic, atraumatic  CV: Intact distal pulses, regular rate  Resp: No respiratory distress or labored breathing  Lymphatic: No lymphadenopathy palpated  Neuro: Alert and Oriented x 3, no focal deficits  Psych: Normal mood, normal affect, normal judgement, normal behavior  Skin: Warm, dry, no rashes, no erythema       Knee Exam (focused):                   RIGHT LEFT   ROM: 0-130 0-120   Palpation: Effusion negative negative     MJL tenderness Negative Negative     LJL tenderness Negative Negative   Instability: Varus stable stable     Valgus stable stable   Special Tests: Lachman Negative Negative     Posterior drawer Negative Negative     Anterior drawer Negative Negative     Pivot shift not tested not tested     Dial not tested not tested   Patella: Palpation no tenderness no tenderness     Mobility 1/4 1/4     Apprehension Negative Negative   Other: Single leg 1/4 squat not tested not tested      LE NV Exam: +2 DP/PT pulses bilaterally  Sensation intact to light touch L2-S1 bilaterally    No calf tenderness to palpation bilaterally      Knee Imaging    MRI of the left knee were reviewed, which demonstrate intact acl graft. No meniscus tears. Minimal djd. I have reviewed the radiology report and agree with their impression.

## 2024-01-03 ENCOUNTER — OFFICE VISIT (OUTPATIENT)
Dept: URGENT CARE | Age: 24
End: 2024-01-03
Payer: COMMERCIAL

## 2024-01-03 VITALS
OXYGEN SATURATION: 99 % | TEMPERATURE: 98.5 F | SYSTOLIC BLOOD PRESSURE: 129 MMHG | RESPIRATION RATE: 14 BRPM | DIASTOLIC BLOOD PRESSURE: 63 MMHG | HEART RATE: 87 BPM

## 2024-01-03 DIAGNOSIS — M54.6 RIGHT-SIDED THORACIC BACK PAIN, UNSPECIFIED CHRONICITY: Primary | ICD-10-CM

## 2024-01-03 PROCEDURE — 99212 OFFICE O/P EST SF 10 MIN: CPT

## 2024-01-03 RX ORDER — IBUPROFEN 600 MG/1
600 TABLET ORAL EVERY 6 HOURS PRN
Status: SHIPPED | OUTPATIENT
Start: 2024-01-03

## 2024-01-03 RX ORDER — METHOCARBAMOL 500 MG/1
500 TABLET, FILM COATED ORAL 4 TIMES DAILY
Qty: 12 TABLET | Refills: 0 | Status: SHIPPED | OUTPATIENT
Start: 2024-01-03

## 2024-01-03 NOTE — PROGRESS NOTES
[facility]        NAME: Jeanie Renteria is a 23 y.o. female  : 2000    MRN: [id number]  DATE: January 3, 2024  TIME: 12:11 PM    Assessment and Plan   Right-sided thoracic back pain, unspecified chronicity [M54.6]  1. Right-sided thoracic back pain, unspecified chronicity  methocarbamol (ROBAXIN) 500 mg tablet    ibuprofen (MOTRIN) tablet 600 mg          Patient with atraumatic back pain after sleeping on a couch overnight.  Patient without neuro symptoms, no urinary incontinence, no numbness no tingling.  Will treat with anti-inflammatories and muscle relaxers.  Patient will follow-up with PCP if no improvement in 5 to 7 days  Patient Instructions       Follow up with PCP in 3-5 days.  Proceed to  ER if symptoms worsen.    Chief Complaint     Chief Complaint   Patient presents with   • Back Pain     Mid back pain x 3 days          History of Present Illness       Patient is a 23-year-old female presenting with 2 days of back pain which started after she slept on the love seat overnight.  Patient states her pain is right upper thoracic area,  which is worth with flexion extension and rotation.  Patient states her pain is 10 out of 10, and has taken ibuprofen 600 mg x 1.  Patient denies numbness tingling, saddle anesthesia.  Patient concerned she is unable to wear 20 pounds of lead at work Without pain    Back Pain        Review of Systems   Review of Systems   Musculoskeletal:  Positive for back pain.         Current Medications       Current Outpatient Medications:   •  methocarbamol (ROBAXIN) 500 mg tablet, Take 1 tablet (500 mg total) by mouth 4 (four) times a day, Disp: 12 tablet, Rfl: 0  •  B COMPLEX VITAMINS PO, , Disp: , Rfl:   •  Cholecalciferol 25 MCG (1000 UT) capsule, Take 1,000 Units by mouth, Disp: , Rfl:   •  escitalopram (LEXAPRO) 10 mg tablet, take 1 tablet by mouth once daily, Disp: 90 tablet, Rfl: 1  •  etonogestrel (Nexplanon) subdermal implant, Inject 68 mg under the skin, Disp:  , Rfl:     Current Facility-Administered Medications:   •  ibuprofen (MOTRIN) tablet 600 mg, 600 mg, Oral, Q6H MILN, KENYATTA Florian    Current Allergies     Allergies as of 01/03/2024   • (No Known Allergies)            The following portions of the patient's history were reviewed and updated as appropriate: allergies, current medications, past family history, past medical history, past social history, past surgical history and problem list.     History reviewed. No pertinent past medical history.    Past Surgical History:   Procedure Laterality Date   • KNEE ARTHROSCOPY W/ ACL RECONSTRUCTION Left     2013/ 2016   • WISDOM TOOTH EXTRACTION  2016       Family History   Problem Relation Age of Onset   • Hypertension Mother    • Hypertension Father    • Breast cancer Paternal Grandmother          Medications have been verified.        Objective   /63 (BP Location: Left arm, Patient Position: Sitting, Cuff Size: Adult)   Pulse 87   Temp 98.5 °F (36.9 °C) (Tympanic)   Resp 14   SpO2 99%   No LMP recorded.       Physical Exam     Physical Exam  Vitals and nursing note reviewed.   Constitutional:       Appearance: Normal appearance.   Eyes:      Extraocular Movements: Extraocular movements intact.   Cardiovascular:      Rate and Rhythm: Normal rate and regular rhythm.      Pulses: Normal pulses.      Heart sounds: Normal heart sounds.   Pulmonary:      Effort: Pulmonary effort is normal.      Breath sounds: Normal breath sounds.   Musculoskeletal:        Arms:       Cervical back: Normal range of motion and neck supple.   Neurological:      Mental Status: She is alert.

## 2024-01-03 NOTE — PATIENT INSTRUCTIONS
Continue take ibuprofen every 6 hours as needed for pain and this also help with any inflammation.  Use muscle relaxers as needed before bed.  Do not drink or drive while taking muscle relaxer.  Do not use a muscle relaxer past 3 days.    Gentle stretching and keep moving!    Do not sleep on couches.

## 2024-01-03 NOTE — LETTER
January 3, 2024     Patient: Jeanie Renteria   YOB: 2000   Date of Visit: 1/3/2024       To Whom it May Concern:    Jeanie Renteria was seen in my clinic on 1/3/2024. She may return to work on 1/4/2024.  Please allow Jeanie to perform alternate duties that do not require lead for the next 24 to 48 hours .    If you have any questions or concerns, please don't hesitate to call.         Sincerely,          KENYATTA Florian        CC: No Recipients

## 2024-01-18 ENCOUNTER — OFFICE VISIT (OUTPATIENT)
Dept: FAMILY MEDICINE CLINIC | Facility: CLINIC | Age: 24
End: 2024-01-18
Payer: COMMERCIAL

## 2024-01-18 VITALS
OXYGEN SATURATION: 99 % | BODY MASS INDEX: 22.08 KG/M2 | HEIGHT: 62 IN | WEIGHT: 120 LBS | SYSTOLIC BLOOD PRESSURE: 100 MMHG | HEART RATE: 68 BPM | DIASTOLIC BLOOD PRESSURE: 80 MMHG

## 2024-01-18 DIAGNOSIS — Z00.00 ANNUAL PHYSICAL EXAM: ICD-10-CM

## 2024-01-18 DIAGNOSIS — F32.A MILD DEPRESSION: Primary | ICD-10-CM

## 2024-01-18 DIAGNOSIS — F41.9 ANXIETY: ICD-10-CM

## 2024-01-18 PROCEDURE — 99214 OFFICE O/P EST MOD 30 MIN: CPT | Performed by: NURSE PRACTITIONER

## 2024-01-18 PROCEDURE — 99395 PREV VISIT EST AGE 18-39: CPT | Performed by: NURSE PRACTITIONER

## 2024-01-18 NOTE — PROGRESS NOTES
ADULT ANNUAL PHYSICAL  [facility]    NAME: Jeanie Renteria  AGE: 23 y.o. SEX: female  : 2000     DATE: 2024     Assessment and Plan:     Problem List Items Addressed This Visit        Other    Anxiety     Stable on lexapro          Mild depression - Primary     Stable on lexapro no SI         Annual physical exam     Routine health labs ordered          Relevant Orders    Lipid panel    Comprehensive metabolic panel    Herpes I/II IgG DON w Reflex to HSV-2    TSH, 3rd generation with Free T4 reflex    CBC and differential       Immunizations and preventive care screenings were discussed with patient today. Appropriate education was printed on patient's after visit summary.    Counseling:  Alcohol/drug use: discussed moderation in alcohol intake, the recommendations for healthy alcohol use, and avoidance of illicit drug use.  Dental Health: discussed importance of regular tooth brushing, flossing, and dental visits.  Injury prevention: discussed safety/seat belts, safety helmets, smoke detectors, carbon dioxide detectors, and smoking near bedding or upholstery.  Sexual health: discussed sexually transmitted diseases, partner selection, use of condoms, avoidance of unintended pregnancy, and contraceptive alternatives.  Exercise: the importance of regular exercise/physical activity was discussed. Recommend exercise 3-5 times per week for at least 30 minutes.       Depression Screening and Follow-up Plan: Patient's depression screening was positive with a PHQ-9 score of 6. Patient advised to follow-up with PCP for further management.         Return in about 5 months (around 2024).     Chief Complaint:     Chief Complaint   Patient presents with   • Follow-up     Routine follow up      History of Present Illness:     Adult Annual Physical   Patient here for a comprehensive physical exam. The patient reports problems - she is here for anxiety and  depression follow up  .    Diet and Physical Activity  Diet/Nutrition:  vegan .   Exercise: 3-4 times a week on average.      Depression Screening  PHQ-2/9 Depression Screening    Little interest or pleasure in doing things: 1 - several days  Feeling down, depressed, or hopeless: 1 - several days  Trouble falling or staying asleep, or sleeping too much: 0 - not at all  Feeling tired or having little energy: 1 - several days  Poor appetite or overeatin - several days  Feeling bad about yourself - or that you are a failure or have let yourself or your family down: 1 - several days  Trouble concentrating on things, such as reading the newspaper or watching television: 1 - several days  Moving or speaking so slowly that other people could have noticed. Or the opposite - being so fidgety or restless that you have been moving around a lot more than usual: 0 - not at all  Thoughts that you would be better off dead, or of hurting yourself in some way: 0 - not at all  PHQ-9 Score: 6  PHQ-9 Interpretation: Mild depression       General Health  Sleep: sleeps well.   Hearing: normal - bilateral.  Vision: no vision problems.   Dental: regular dental visits.       /GYN Health  Follows with gynecology? yes   Last menstrual period:   Contraceptive method:  implant .  History of STDs?: no.     Advanced Care Planning  Do you have an advanced directive? no  Do you have a durable medical power of ? no     Review of Systems:     Review of Systems   Constitutional: Negative.    Eyes:  Negative for visual disturbance.   Respiratory: Negative.     Cardiovascular: Negative.    Gastrointestinal:  Negative for constipation.   Genitourinary:  Negative for menstrual problem.   Musculoskeletal:  Positive for back pain.   Neurological:  Negative for dizziness and headaches.   Psychiatric/Behavioral:  Positive for dysphoric mood. Negative for sleep disturbance and suicidal ideas. The patient is nervous/anxious.       Past Medical  History:     Past Medical History:   Diagnosis Date   • Anxiety    • Depression       Past Surgical History:     Past Surgical History:   Procedure Laterality Date   • KNEE ARTHROSCOPY W/ ACL RECONSTRUCTION Left     2013/ 2016   • KNEE SURGERY  2/2014, 8/2016   • WISDOM TOOTH EXTRACTION  2016      Social History:     Social History     Socioeconomic History   • Marital status: Single     Spouse name: None   • Number of children: None   • Years of education: None   • Highest education level: None   Occupational History   • None   Tobacco Use   • Smoking status: Never   • Smokeless tobacco: Never   Vaping Use   • Vaping status: Never Used   Substance and Sexual Activity   • Alcohol use: Not Currently     Alcohol/week: 1.0 standard drink of alcohol     Comment: occasional social drinker   • Drug use: Yes     Types: Marijuana     Comment: Medicinal   • Sexual activity: Yes     Partners: Female, Male     Birth control/protection: Condom Male, Implant     Comment: Nexplanon   Other Topics Concern   • None   Social History Narrative   • None     Social Determinants of Health     Financial Resource Strain: Not on file   Food Insecurity: Not on file   Transportation Needs: Not on file   Physical Activity: Not on file   Stress: Not on file   Social Connections: Not on file   Intimate Partner Violence: Not on file   Housing Stability: Not on file      Family History:     Family History   Problem Relation Age of Onset   • Hypertension Mother    • Depression Mother    • Hyperlipidemia Mother    • ADD / ADHD Mother    • Anxiety disorder Mother    • Hypertension Father    • Breast cancer Paternal Grandmother    • Cancer Paternal Grandmother    • Stroke Paternal Grandfather       Current Medications:     Current Outpatient Medications   Medication Sig Dispense Refill   • B COMPLEX VITAMINS PO      • Cholecalciferol 25 MCG (1000 UT) capsule Take 1,000 Units by mouth     • escitalopram (LEXAPRO) 10 mg tablet take 1 tablet by mouth  "once daily 90 tablet 1   • etonogestrel (Nexplanon) subdermal implant Inject 68 mg under the skin       Current Facility-Administered Medications   Medication Dose Route Frequency Provider Last Rate Last Admin   • ibuprofen (MOTRIN) tablet 600 mg  600 mg Oral Q6H PRN KENYATTA Florian          Allergies:     No Known Allergies   Physical Exam:     /80 (BP Location: Left arm, Patient Position: Sitting, Cuff Size: Standard)   Pulse 68   Ht 5' 2\" (1.575 m)   Wt 54.4 kg (120 lb)   SpO2 99%   BMI 21.95 kg/m²     Physical Exam  Vitals and nursing note reviewed.   Constitutional:       General: She is not in acute distress.     Appearance: Normal appearance. She is well-developed and normal weight. She is not diaphoretic.   HENT:      Head: Normocephalic and atraumatic.      Right Ear: Tympanic membrane and external ear normal.      Left Ear: Tympanic membrane and external ear normal.      Nose: Nose normal.      Mouth/Throat:      Mouth: Mucous membranes are moist.      Pharynx: No oropharyngeal exudate or posterior oropharyngeal erythema.   Eyes:      Extraocular Movements: Extraocular movements intact.      Conjunctiva/sclera: Conjunctivae normal.      Pupils: Pupils are equal, round, and reactive to light.   Neck:      Thyroid: No thyromegaly.      Vascular: No carotid bruit or JVD.   Cardiovascular:      Rate and Rhythm: Normal rate and regular rhythm.      Pulses:           Carotid pulses are 2+ on the right side and 2+ on the left side.     Heart sounds: Normal heart sounds, S1 normal and S2 normal. No murmur heard.  Pulmonary:      Effort: Pulmonary effort is normal.      Breath sounds: Normal breath sounds.   Abdominal:      General: Bowel sounds are normal.      Palpations: Abdomen is soft.      Tenderness: There is no abdominal tenderness.   Musculoskeletal:         General: Normal range of motion.      Cervical back: Normal range of motion.      Right lower leg: No edema.      Left lower leg: No " edema.   Lymphadenopathy:      Cervical: No cervical adenopathy.   Skin:     General: Skin is warm.      Capillary Refill: Capillary refill takes less than 2 seconds.   Neurological:      Mental Status: She is alert and oriented to person, place, and time.      Motor: Motor function is intact.      Coordination: Coordination is intact.      Gait: Gait is intact.   Psychiatric:         Mood and Affect: Mood normal.         Behavior: Behavior normal.         Thought Content: Thought content normal. Thought content does not include suicidal ideation.         Judgment: Judgment normal.          KENYATTA Rivas   [facility]IMARY [facility]

## 2024-01-19 ENCOUNTER — TELEPHONE (OUTPATIENT)
Dept: ADMINISTRATIVE | Facility: OTHER | Age: 24
End: 2024-01-19

## 2024-01-19 NOTE — TELEPHONE ENCOUNTER
Upon review of the In Basket request we were able to locate, review, and update the patient chart as requested for Hepatitis C  and HIV.    Any additional questions or concerns should be emailed to the Practice Liaisons via the appropriate education email address, please do not reply via In Basket.    Thank you  Minerva Rae

## 2024-01-19 NOTE — TELEPHONE ENCOUNTER
----- Message from KENYATTA Ware sent at 1/18/2024  2:25 PM EST -----  01/18/24 2:26 PM    Hello, our patient Jeanie Renteria has had Hepatitis C and HIV completed/performed. Please assist in updating the patient chart by pulling the Care Everywhere (CE) document. The date of service is 4/21/2022.     Thank you,  KENYATTA Rivas  [facility] PRIMARY CARE

## 2024-02-09 ENCOUNTER — APPOINTMENT (OUTPATIENT)
Dept: LAB | Facility: CLINIC | Age: 24
End: 2024-02-09
Payer: COMMERCIAL

## 2024-02-09 DIAGNOSIS — Z00.00 ANNUAL PHYSICAL EXAM: ICD-10-CM

## 2024-02-09 LAB
ALBUMIN SERPL BCP-MCNC: 4.5 G/DL (ref 3.5–5)
ALP SERPL-CCNC: 48 U/L (ref 34–104)
ALT SERPL W P-5'-P-CCNC: 12 U/L (ref 7–52)
ANION GAP SERPL CALCULATED.3IONS-SCNC: 5 MMOL/L
AST SERPL W P-5'-P-CCNC: 18 U/L (ref 13–39)
BASOPHILS # BLD AUTO: 0.06 THOUSANDS/ÂΜL (ref 0–0.1)
BASOPHILS NFR BLD AUTO: 1 % (ref 0–1)
BILIRUB SERPL-MCNC: 1.03 MG/DL (ref 0.2–1)
BUN SERPL-MCNC: 12 MG/DL (ref 5–25)
CALCIUM SERPL-MCNC: 9.6 MG/DL (ref 8.4–10.2)
CHLORIDE SERPL-SCNC: 106 MMOL/L (ref 96–108)
CHOLEST SERPL-MCNC: 143 MG/DL
CO2 SERPL-SCNC: 27 MMOL/L (ref 21–32)
CREAT SERPL-MCNC: 0.61 MG/DL (ref 0.6–1.3)
EOSINOPHIL # BLD AUTO: 0.18 THOUSAND/ÂΜL (ref 0–0.61)
EOSINOPHIL NFR BLD AUTO: 4 % (ref 0–6)
ERYTHROCYTE [DISTWIDTH] IN BLOOD BY AUTOMATED COUNT: 12.1 % (ref 11.6–15.1)
GFR SERPL CREATININE-BSD FRML MDRD: 127 ML/MIN/1.73SQ M
GLUCOSE P FAST SERPL-MCNC: 86 MG/DL (ref 65–99)
HCT VFR BLD AUTO: 36 % (ref 34.8–46.1)
HDLC SERPL-MCNC: 57 MG/DL
HGB BLD-MCNC: 12.2 G/DL (ref 11.5–15.4)
IMM GRANULOCYTES # BLD AUTO: 0.01 THOUSAND/UL (ref 0–0.2)
IMM GRANULOCYTES NFR BLD AUTO: 0 % (ref 0–2)
LDLC SERPL CALC-MCNC: 75 MG/DL (ref 0–100)
LYMPHOCYTES # BLD AUTO: 1.36 THOUSANDS/ÂΜL (ref 0.6–4.47)
LYMPHOCYTES NFR BLD AUTO: 31 % (ref 14–44)
MCH RBC QN AUTO: 30.7 PG (ref 26.8–34.3)
MCHC RBC AUTO-ENTMCNC: 33.9 G/DL (ref 31.4–37.4)
MCV RBC AUTO: 91 FL (ref 82–98)
MONOCYTES # BLD AUTO: 0.42 THOUSAND/ÂΜL (ref 0.17–1.22)
MONOCYTES NFR BLD AUTO: 10 % (ref 4–12)
NEUTROPHILS # BLD AUTO: 2.36 THOUSANDS/ÂΜL (ref 1.85–7.62)
NEUTS SEG NFR BLD AUTO: 54 % (ref 43–75)
NONHDLC SERPL-MCNC: 86 MG/DL
NRBC BLD AUTO-RTO: 0 /100 WBCS
PLATELET # BLD AUTO: 256 THOUSANDS/UL (ref 149–390)
PMV BLD AUTO: 11.6 FL (ref 8.9–12.7)
POTASSIUM SERPL-SCNC: 4.1 MMOL/L (ref 3.5–5.3)
PROT SERPL-MCNC: 7.5 G/DL (ref 6.4–8.4)
RBC # BLD AUTO: 3.98 MILLION/UL (ref 3.81–5.12)
SODIUM SERPL-SCNC: 138 MMOL/L (ref 135–147)
TRIGL SERPL-MCNC: 53 MG/DL
TSH SERPL DL<=0.05 MIU/L-ACNC: 0.68 UIU/ML (ref 0.45–4.5)
WBC # BLD AUTO: 4.39 THOUSAND/UL (ref 4.31–10.16)

## 2024-02-09 PROCEDURE — 85025 COMPLETE CBC W/AUTO DIFF WBC: CPT

## 2024-02-09 PROCEDURE — 86696 HERPES SIMPLEX TYPE 2 TEST: CPT

## 2024-02-09 PROCEDURE — 84443 ASSAY THYROID STIM HORMONE: CPT

## 2024-02-09 PROCEDURE — 86695 HERPES SIMPLEX TYPE 1 TEST: CPT

## 2024-02-10 LAB
HSV1 IGG SER IA-ACNC: 44 INDEX (ref 0–0.9)
HSV2 IGG SER IA-ACNC: <0.91 INDEX (ref 0–0.9)

## 2024-04-30 DIAGNOSIS — F41.9 ANXIETY: ICD-10-CM

## 2024-04-30 DIAGNOSIS — F32.A MILD DEPRESSION: ICD-10-CM

## 2024-05-01 RX ORDER — ESCITALOPRAM OXALATE 10 MG/1
10 TABLET ORAL DAILY
Qty: 90 TABLET | Refills: 1 | Status: SHIPPED | OUTPATIENT
Start: 2024-05-01

## 2024-05-14 ENCOUNTER — OFFICE VISIT (OUTPATIENT)
Dept: PHYSICAL THERAPY | Facility: CLINIC | Age: 24
End: 2024-05-14
Payer: COMMERCIAL

## 2024-05-14 DIAGNOSIS — M25.562 CHRONIC PAIN OF LEFT KNEE: Primary | ICD-10-CM

## 2024-05-14 DIAGNOSIS — G89.29 CHRONIC PAIN OF LEFT KNEE: Primary | ICD-10-CM

## 2024-05-14 PROCEDURE — 97112 NEUROMUSCULAR REEDUCATION: CPT | Performed by: PHYSICAL THERAPIST

## 2024-05-14 PROCEDURE — 97162 PT EVAL MOD COMPLEX 30 MIN: CPT | Performed by: PHYSICAL THERAPIST

## 2024-05-14 PROCEDURE — 97110 THERAPEUTIC EXERCISES: CPT | Performed by: PHYSICAL THERAPIST

## 2024-05-14 NOTE — PROGRESS NOTES
PT Evaluation     Today's date: 2024  Patient name: Jeanie Renteria  : 2000  MRN: [id number]  Referring provider: Henrietta Meyer CRNP  Dx:   Encounter Diagnosis     ICD-10-CM    1. Chronic pain of left knee  M25.562     G89.29                      Assessment  Assessment details: Pt is a 23 y.o. year old female presenting to physical therapy for left knee pain and presents with symptoms consistent with meniscal involvement.  She presents with the following impairments: limited L knee flexion ROM deficits, joint line TTP, and (+) mcmurrays and thessaly's as well as L LE weakness affecting her function with walking, standing long periods, working, squatting, lunging, stairs, running, and playing sports/exercising.  Pt will benefit from skilled physical therapy to address functional limitations noted in evaluation and meet patient goals.  Impairments: abnormal or restricted ROM, abnormal movement, activity intolerance, impaired physical strength, lacks appropriate home exercise program, pain with function and poor body mechanics    Symptom irritability: moderate  Goals  ST. Pt will be able to stand a full work day with 2/10 knee pain or less.  2. Pt will improve L knee flexion to 145 degrees.    LT. Pt will improve L knee ext strength to 5/5 for improved stair navigation.  2. Pt will demonstrate good squatting mechanics to allow for safe pain-free lifting.  3. Pt will meet projected FOTO score.    Plan  Plan details: Every other week  Patient would benefit from: PT eval and skilled physical therapy  Planned modality interventions: cryotherapy, electrical stimulation/Russian stimulation, TENS, thermotherapy: hydrocollator packs and unattended electrical stimulation  Planned therapy interventions: joint mobilization, abdominal trunk stabilization, manual therapy, balance, neuromuscular re-education, body mechanics training, patient education, strengthening, stretching, therapeutic activities,  therapeutic exercise, functional ROM exercises, flexibility and home exercise program  Frequency: 1x week  Duration in weeks: 10        Subjective Evaluation    History of Present Illness  Mechanism of injury: Pt reports chronic left knee pain, and has hx of 2 ACL repairs with most recent one in 2016 (hamstring and patellar grafts).  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  She now does yoga and lifts, but has some pain.  About group home through her work day she starts to feel her knee pain and tries to sit down. She feels lots of clicking in her knee occasionally sonya.  She cannot bend her L knee as far as the right and cannot kneel down.  Pain  Current pain ratin  At worst pain ratin          Objective     Tenderness   Left Knee   Tenderness in the lateral joint line, medial joint line and patellar tendon.     Active Range of Motion   Left Knee   Flexion: 135 degrees with pain  Extension: 0 degrees   Extensor la degrees     Right Knee   Flexion: 150 degrees   Extension: 0 degrees   Extensor la degrees     Mobility   Patellar Mobility:   Left Knee   WFL: medial, lateral, superior and inferior.     Strength/Myotome Testing     Left Knee   Flexion: 4-  Extension: 4+  Quadriceps contraction: good    Right Knee   Flexion: 5  Extension: 5  Quadriceps contraction: good    Additional Strength Details  Good squat mechanics w slight favoring of R LE  Poor lateral step down b/l with knee valgus present b/l and hip drop on L  Good lunge mechanics w some knee valgus on the left and knee pain.    SLS:  EO b/l 30+ secs  R EC = 18secs  L EC = 15secs    Tests     Left Knee   Positive lateral Julian and Thessaly's test at 5 degrees.   Negative anterior Lachman, medial Julian, patellar apprehension, patellar compression, valgus stress test at 0 degrees, valgus stress test at 30 degrees, varus stress test at 0 degrees and varus stress  "test at 30 degrees.              Precautions: L knee meniscal tear likely    Date 5/14            Visit # IE            FOTO IE             Re-eval IE              Manuals 5/14                                                                Neuro Re-Ed             Running analysis nv            BOSU  3x20\" SLS ball up            BOSU w perturbations             Bridges 10x Purple TB            SLS airex w ball toss             Agility ladder                                       Ther Ex             Bike/Elliptical             Leg press             SL leg press             SLR 2x15 2#            S/l hip abd 2x15 2#            Prone hamstring curls                                                    Ther Activity             Lateral walks 4 laps purple TB            Squats             Lunges w banded hip resistance             SL RDL                          Gait Training                                       Modalities                                            "

## 2024-06-03 ENCOUNTER — APPOINTMENT (OUTPATIENT)
Dept: PHYSICAL THERAPY | Facility: CLINIC | Age: 24
End: 2024-06-03
Payer: COMMERCIAL

## 2024-06-07 ENCOUNTER — OFFICE VISIT (OUTPATIENT)
Dept: PHYSICAL THERAPY | Facility: CLINIC | Age: 24
End: 2024-06-07
Payer: COMMERCIAL

## 2024-06-07 DIAGNOSIS — G89.29 CHRONIC PAIN OF LEFT KNEE: Primary | ICD-10-CM

## 2024-06-07 DIAGNOSIS — M25.562 CHRONIC PAIN OF LEFT KNEE: Primary | ICD-10-CM

## 2024-06-07 PROCEDURE — 97112 NEUROMUSCULAR REEDUCATION: CPT | Performed by: PHYSICAL THERAPIST

## 2024-06-07 PROCEDURE — 97530 THERAPEUTIC ACTIVITIES: CPT | Performed by: PHYSICAL THERAPIST

## 2024-06-07 PROCEDURE — 97110 THERAPEUTIC EXERCISES: CPT | Performed by: PHYSICAL THERAPIST

## 2024-06-07 NOTE — PROGRESS NOTES
"Daily Note     Today's date: 2024  Patient name: Jeanie Renteria  : 2000  MRN: [id number]  Referring provider: Henrietta Meyer CRNP  Dx:   Encounter Diagnosis     ICD-10-CM    1. Chronic pain of left knee  M25.562     G89.29           Start Time: 0730  Stop Time: 0815  Total time in clinic (min): 45 minutes    Subjective: Patient reports 2/10 pain today and wears a knee brace at work. She reports that her knee has been feeling better over the past week but is still aggravated easily.      Objective: See treatment diary below      Assessment: Patient exhibited knee valgus with lateral walks with PTB, able to correct with VC. Patient exhibits hip drop more on R than L, increased adduction, slight knee valgus on L, and increased pronation b/l with running analysis, patient educated on improving adduction and knee valgus to reduce stress on knee. Patient exhibited appropriate challenge and fatigue with SL balance on bosu with ball toss requiring UE support with every few repetitions. Patient tolerated session well with no adverse effects. Patient demonstrated fatigue post treatment, exhibited good technique with therapeutic exercises, and would benefit from continued PT.      Plan: Continue per plan of care.  Progress treatment as tolerated.      Session completed by DIRK Womack under supervision from Sonido Padilla DPT.     Precautions: L knee meniscal tear likely    Date            Visit # IE 2           FOTO IE             Re-eval IE              Manuals                                                                Neuro Re-Ed             Running analysis nv Discussed hip drop on the L, L knee valgus, and gait crossing midline           BOSU  3x20\" SLS ball up 3x30\" SLS both sides of bosu w/ ball toss           BOSU w perturbations             Bridges 10x Purple TB            SLS airex w ball toss             Agility ladder                                       Ther Ex         "     Bike/Elliptical  6'           Leg press  3x10 125#           SL leg press             SLR 2x15 2#            S/l hip abd 2x15 2#            Prone hamstring curls  Standing 5# 20x ea           Prone ext  3x10 5# ea                                     Ther Activity  6/6           Lateral walks 4 laps purple TB 5 laps PTB           Squats  3x10 26# KB           Lunges w banded hip resistance             SL RDL  3x10 ea 15# KB                        Gait Training                                       Modalities

## 2024-06-10 ENCOUNTER — OFFICE VISIT (OUTPATIENT)
Dept: FAMILY MEDICINE CLINIC | Facility: CLINIC | Age: 24
End: 2024-06-10
Payer: COMMERCIAL

## 2024-06-10 VITALS
BODY MASS INDEX: 21.71 KG/M2 | HEART RATE: 77 BPM | WEIGHT: 118 LBS | OXYGEN SATURATION: 100 % | DIASTOLIC BLOOD PRESSURE: 70 MMHG | HEIGHT: 62 IN | SYSTOLIC BLOOD PRESSURE: 104 MMHG

## 2024-06-10 DIAGNOSIS — F41.9 ANXIETY: Primary | ICD-10-CM

## 2024-06-10 DIAGNOSIS — F32.A MILD DEPRESSION: ICD-10-CM

## 2024-06-10 PROCEDURE — 99213 OFFICE O/P EST LOW 20 MIN: CPT | Performed by: NURSE PRACTITIONER

## 2024-06-10 RX ORDER — BUPROPION HYDROCHLORIDE 75 MG/1
75 TABLET ORAL DAILY
Qty: 40 TABLET | Refills: 0 | Status: SHIPPED | OUTPATIENT
Start: 2024-06-10

## 2024-06-10 RX ORDER — BUPROPION HYDROCHLORIDE 75 MG/1
75 TABLET ORAL 2 TIMES DAILY
Qty: 40 TABLET | Refills: 0 | Status: SHIPPED | OUTPATIENT
Start: 2024-06-10 | End: 2024-06-10 | Stop reason: DRUGHIGH

## 2024-06-10 NOTE — ASSESSMENT & PLAN NOTE
Pt reports Lexapro has caused her continued nausea   She would like to try another medication because it is making it hard for her to eat due to the constant nausea  Will start Wellbutrin to see if that improves symptoms  Will taper lexapro having patient take normal 10mg dose for 1 week with Wellbutrin and then take 5 mg for 1 week and then stop.   Will do virtual visit in 2 weeks to check to see if patient is having any negative side effects   Denies SI/HI

## 2024-06-10 NOTE — PROGRESS NOTES
Ambulatory Visit  Name: Jeanie Renteria      : 2000      MRN: [id number]  Encounter Provider: KENYATTA Rivas  Encounter Date: 6/10/2024   Encounter department: [facility]    Assessment & Plan   1. Anxiety  2. Mild depression  Assessment & Plan:  Pt reports Lexapro has caused her continued nausea   She would like to try another medication because it is making it hard for her to eat due to the constant nausea  Will start Wellbutrin to see if that improves symptoms  Will taper lexapro having patient take normal 10mg dose for 1 week with Wellbutrin and then take 5 mg for 1 week and then stop.   Will do virtual visit in 2 weeks to check to see if patient is having any negative side effects   Denies SI/HI   Orders:  -     buPROPion (WELLBUTRIN) 75 mg tablet; Take 1 tablet (75 mg total) by mouth in the morning       History of Present Illness   {Disappearing Hyperlinks I Encounters * My Last Note * Since Last Visit * History :50498}  Reports feeling nauseous on her Lexapro and would like to switch due to continued nausea         Review of Systems   Constitutional:  Negative for activity change, appetite change, chills, fatigue, fever and unexpected weight change.   HENT:  Negative for congestion, ear pain and sore throat.    Eyes:  Negative for pain and visual disturbance.   Respiratory:  Negative for cough and shortness of breath.    Cardiovascular:  Negative for chest pain and palpitations.   Gastrointestinal:  Positive for nausea. Negative for abdominal pain and vomiting.   Genitourinary:  Negative for dysuria and hematuria.   Musculoskeletal:  Negative for arthralgias and back pain.   Skin:  Negative for color change and rash.   Neurological:  Negative for seizures and syncope.   All other systems reviewed and are negative.      Objective   {Disappearing Hyperlinks   Review Vitals * Enter New Vitals * Results Review * Labs * Imaging * Cardiology * Procedures * Lung Cancer Screening  ":[id number]}  /70   Pulse 77   Ht 5' 2\" (1.575 m)   Wt 53.5 kg (118 lb)   SpO2 100%   BMI 21.58 kg/m²     Physical Exam  Vitals and nursing note reviewed.   Constitutional:       General: She is not in acute distress.     Appearance: Normal appearance. She is well-developed. She is not diaphoretic.   HENT:      Head: Normocephalic and atraumatic.      Right Ear: Tympanic membrane and external ear normal.      Left Ear: Tympanic membrane and external ear normal.      Nose: Nose normal.      Mouth/Throat:      Mouth: Mucous membranes are moist.      Pharynx: No oropharyngeal exudate or posterior oropharyngeal erythema.   Eyes:      Extraocular Movements: Extraocular movements intact.      Conjunctiva/sclera: Conjunctivae normal.      Pupils: Pupils are equal, round, and reactive to light.   Neck:      Thyroid: No thyromegaly.      Vascular: No carotid bruit or JVD.   Cardiovascular:      Rate and Rhythm: Normal rate and regular rhythm.      Pulses:           Carotid pulses are 2+ on the right side and 2+ on the left side.     Heart sounds: Normal heart sounds, S1 normal and S2 normal. No murmur heard.  Pulmonary:      Effort: Pulmonary effort is normal.      Breath sounds: Normal breath sounds.   Abdominal:      General: Bowel sounds are normal.      Palpations: Abdomen is soft.      Tenderness: There is no abdominal tenderness.   Musculoskeletal:         General: Normal range of motion.      Cervical back: Normal range of motion.      Right lower leg: No edema.      Left lower leg: No edema.   Lymphadenopathy:      Cervical: No cervical adenopathy.   Skin:     General: Skin is warm.      Capillary Refill: Capillary refill takes less than 2 seconds.   Neurological:      Mental Status: She is alert and oriented to person, place, and time.      Motor: Motor function is intact.      Coordination: Coordination is intact.      Gait: Gait is intact.   Psychiatric:         Mood and Affect: Mood normal.         " Behavior: Behavior normal.         Thought Content: Thought content normal.         Judgment: Judgment normal.     Administrative Statements {Disappearing Hyperlinks I  [facility] of Service * [id number]}

## 2024-06-13 ENCOUNTER — APPOINTMENT (OUTPATIENT)
Dept: PHYSICAL THERAPY | Facility: CLINIC | Age: 24
End: 2024-06-13
Payer: COMMERCIAL

## 2024-06-20 ENCOUNTER — APPOINTMENT (OUTPATIENT)
Dept: PHYSICAL THERAPY | Facility: CLINIC | Age: 24
End: 2024-06-20
Payer: COMMERCIAL

## 2024-06-24 ENCOUNTER — TELEMEDICINE (OUTPATIENT)
Dept: FAMILY MEDICINE CLINIC | Facility: CLINIC | Age: 24
End: 2024-06-24
Payer: COMMERCIAL

## 2024-06-24 ENCOUNTER — APPOINTMENT (OUTPATIENT)
Dept: PHYSICAL THERAPY | Facility: CLINIC | Age: 24
End: 2024-06-24
Payer: COMMERCIAL

## 2024-06-24 DIAGNOSIS — F41.9 ANXIETY: Primary | ICD-10-CM

## 2024-06-24 DIAGNOSIS — F32.A MILD DEPRESSION: ICD-10-CM

## 2024-06-24 PROCEDURE — 99213 OFFICE O/P EST LOW 20 MIN: CPT | Performed by: NURSE PRACTITIONER

## 2024-06-24 NOTE — PROGRESS NOTES
Virtual Regular Visit    Verification of patient location:    Patient is located at Home in the following state in which I hold an active license [place]      Assessment/Plan:    Problem List Items Addressed This Visit        Behavioral Health    Anxiety - Primary     Successfully weaned off lexapro   Doing much better with wellbutrin   Can use melatonin to help with sleep still   We will continue with wellbutrin for additional 6 weeks and follow up again          Mild depression     No SI/HI  Weaned off lexapro now  Continue with wellbutrin 75mg   Recheck in 6 weeks                 Reason for visit is   Chief Complaint   Patient presents with   • Virtual Regular Visit          Encounter provider KENYATTA Rivas      Recent Visits  No visits were found meeting these conditions.  Showing recent visits within past 7 days and meeting all other requirements  Today's Visits  Date Type Provider Dept   06/24/24 Telemedicine KENYATTA [facility]   Showing today's visits and meeting all other requirements  Future Appointments  No visits were found meeting these conditions.  Showing future appointments within next 150 days and meeting all other requirements       The patient was identified by name and date of birth. Jeanie Renteria was informed that this is a telemedicine visit and that the visit is being conducted through the Epic Embedded platform. She agrees to proceed..  My office door was closed. No one else was in the room.  She acknowledged consent and understanding of privacy and security of the video platform. The patient has agreed to participate and understands they can discontinue the visit at any time.    Patient is aware this is a billable service.     Subjective  Jeanie Renteria is a 24 y.o. female  .      Patient presents for a 2 week follow up after weaning off lexapro and starting wellbutrin   Feeling more energetic and not having any nausea  Appetite still kind of down   She having some  trouble sleeping- relief with melatonin   She is feeling a lot of energy and feeling really productive     Stopped taking lexapro 2-3 days ago and is doing well          Past Medical History:   Diagnosis Date   • Anxiety    • Depression        Past Surgical History:   Procedure Laterality Date   • KNEE ARTHROSCOPY W/ ACL RECONSTRUCTION Left     2013/ 2016   • KNEE SURGERY  2/2014, 8/2016   • WISDOM TOOTH EXTRACTION  2016       Current Outpatient Medications   Medication Sig Dispense Refill   • B COMPLEX VITAMINS PO      • buPROPion (WELLBUTRIN) 75 mg tablet Take 1 tablet (75 mg total) by mouth in the morning 40 tablet 0   • Cholecalciferol 25 MCG (1000 UT) capsule Take 1,000 Units by mouth     • etonogestrel (Nexplanon) subdermal implant Inject 68 mg under the skin       Current Facility-Administered Medications   Medication Dose Route Frequency Provider Last Rate Last Admin   • ibuprofen (MOTRIN) tablet 600 mg  600 mg Oral Q6H PRN KENYATTA Florian            No Known Allergies    Review of Systems   Constitutional:  Positive for activity change and appetite change.   Gastrointestinal:  Negative for nausea.   Neurological:  Negative for dizziness and light-headedness.   Psychiatric/Behavioral:  Positive for sleep disturbance. Negative for agitation and dysphoric mood. The patient is not nervous/anxious.        Video Exam    There were no vitals filed for this visit.    Physical Exam  Constitutional:       General: She is not in acute distress.     Appearance: She is well-developed and normal weight. She is not diaphoretic.   HENT:      Head: Normocephalic and atraumatic.      Right Ear: External ear normal.      Left Ear: External ear normal.   Eyes:      Conjunctiva/sclera: Conjunctivae normal.   Pulmonary:      Effort: Pulmonary effort is normal. No respiratory distress.   Neurological:      Mental Status: She is alert and oriented to person, place, and time.   Psychiatric:         Mood and Affect: Mood  normal.         Behavior: Behavior normal.         Thought Content: Thought content normal.         Judgment: Judgment normal.          Visit Time  Total Visit Duration: 8

## 2024-06-24 NOTE — ASSESSMENT & PLAN NOTE
Successfully weaned off lexapro   Doing much better with wellbutrin   Can use melatonin to help with sleep still   We will continue with wellbutrin for additional 6 weeks and follow up again

## 2024-07-05 ENCOUNTER — OFFICE VISIT (OUTPATIENT)
Dept: PHYSICAL THERAPY | Facility: CLINIC | Age: 24
End: 2024-07-05
Payer: COMMERCIAL

## 2024-07-05 DIAGNOSIS — M25.562 CHRONIC PAIN OF LEFT KNEE: Primary | ICD-10-CM

## 2024-07-05 DIAGNOSIS — G89.29 CHRONIC PAIN OF LEFT KNEE: Primary | ICD-10-CM

## 2024-07-05 PROCEDURE — 97110 THERAPEUTIC EXERCISES: CPT | Performed by: PHYSICAL THERAPIST

## 2024-07-05 PROCEDURE — 97112 NEUROMUSCULAR REEDUCATION: CPT | Performed by: PHYSICAL THERAPIST

## 2024-07-05 PROCEDURE — 97530 THERAPEUTIC ACTIVITIES: CPT | Performed by: PHYSICAL THERAPIST

## 2024-07-05 NOTE — PROGRESS NOTES
"Daily Note     Today's date: 2024  Patient name: Jeanie Renteria  : 2000  MRN: [id number]  Referring provider: Henrietta Meyer CRNP  Dx:   Encounter Diagnosis     ICD-10-CM    1. Chronic pain of left knee  M25.562     G89.29           Start Time: 0830  Stop Time: 0930  Total time in clinic (min): 60 minutes    Subjective: Patient reports 4-5/10 pain today after working overtime. She reports increased knee clicking and swelling at the end of shift and she tries to sit down as much as she can.       Objective: See treatment diary below      Assessment: Patient tolerated start of resisted monster walk and progression of prone HS curl well today and demonstrated proper form with some discomfort in R knee during HS curl. Pt tolerated balance on bosu well with no discomfort and needed UE support twice throughout exercise. Pt demonstrates minimal knee valgus today with squats and leg press. Patient demonstrated fatigue post treatment, exhibited good technique with therapeutic exercises, and would benefit from continued PT.      Plan: Continue per plan of care.  Progress treatment as tolerated.      Session completed by DIRK Womack under supervision form Stephen Earl DPT.     Precautions: L knee meniscal tear likely    Date           Visit # IE 2 3          FOTO IE             Re-eval IE              Manuals           LAD   ND                                                 Neuro Re-Ed            Running analysis nv Discussed hip drop on the L, L knee valgus, and gait crossing midline           BOSU  3x20\" SLS ball up 3x30\" SLS both sides of bosu w/ ball toss 4x30\" ea SLS w/ bosu ball toss          BOSU w perturbations             Bridges 10x Purple TB            SLS airex w ball toss             Agility ladder             Monster walks    5 laps GTB                       Ther Ex            Bike/Elliptical  6' 6'          Leg press  3x10 125# 3x10 125#          SL " leg press             SLR 2x15 2#            S/l hip abd 2x15 2#            Prone hamstring curls  Standing 5# 20x ea 10# 2x15ea          Prone ext  3x10 5# ea 2x15 5# ea                                    Ther Activity  6/6 7/5          Lateral walks 4 laps purple TB 5 laps PTB 5 laps GTB          Squats  3x10 26# KB 3x10 26# KB          Lunges w banded hip resistance             SL RDL  3x10 ea 15# KB 2x15 ea 15# KB                       Gait Training                                       Modalities

## 2024-07-07 DIAGNOSIS — F32.A MILD DEPRESSION: ICD-10-CM

## 2024-07-07 RX ORDER — BUPROPION HYDROCHLORIDE 75 MG/1
75 TABLET ORAL EVERY MORNING
Qty: 100 TABLET | Refills: 1 | Status: SHIPPED | OUTPATIENT
Start: 2024-07-07

## 2024-07-15 ENCOUNTER — APPOINTMENT (OUTPATIENT)
Dept: PHYSICAL THERAPY | Facility: CLINIC | Age: 24
End: 2024-07-15
Payer: COMMERCIAL

## 2024-07-26 ENCOUNTER — OFFICE VISIT (OUTPATIENT)
Dept: PHYSICAL THERAPY | Facility: CLINIC | Age: 24
End: 2024-07-26
Payer: COMMERCIAL

## 2024-07-26 DIAGNOSIS — M25.562 CHRONIC PAIN OF LEFT KNEE: Primary | ICD-10-CM

## 2024-07-26 DIAGNOSIS — G89.29 CHRONIC PAIN OF LEFT KNEE: Primary | ICD-10-CM

## 2024-07-26 PROCEDURE — 97112 NEUROMUSCULAR REEDUCATION: CPT | Performed by: PHYSICAL THERAPIST

## 2024-07-26 PROCEDURE — 97110 THERAPEUTIC EXERCISES: CPT | Performed by: PHYSICAL THERAPIST

## 2024-07-26 PROCEDURE — 97530 THERAPEUTIC ACTIVITIES: CPT | Performed by: PHYSICAL THERAPIST

## 2024-07-26 NOTE — PROGRESS NOTES
"Daily Note     Today's date: 2024  Patient name: Jeanie Renteria  : 2000  MRN: [id number]  Referring provider: Henrietta Meyer CRNP  Dx:   Encounter Diagnosis     ICD-10-CM    1. Chronic pain of left knee  M25.562     G89.29           Start Time: 09  Stop Time: 1020  Total time in clinic (min): 45 minutes    Subjective: Patient reports increased knee pain lately at 5/10 after picking up work shifts. Pt reports no knee swelling after 13-14 hour shifts. She reports onset of crepitus about 2 weeks ago.      Objective: See treatment diary below      Assessment: Patient exhibited improvement in BOSU balance since last session needing only one or two instances of UE support to maintain balance. Patient reports no adverse effects throughout the session. Patient educated on using 2 3# ankle weights on L LE while sitting to increase joint space before and after long periods of standing. Patient demonstrated fatigue post treatment, exhibited good technique with therapeutic exercises, and would benefit from continued PT. Discharged with updated HEP.      Plan: Discharged with updated HEP.    Session completed by SPT Milo Womack under supervision from Sonido Padilla DPT.      Precautions: L knee meniscal tear likely    Date          Visit # IE 2 3 4         FOTO IE             Re-eval IE              Manuals          LAD   ND ND L hip and knee                                                 Neuro Re-Ed           Running analysis nv Discussed hip drop on the L, L knee valgus, and gait crossing midline           BOSU  3x20\" SLS ball up 3x30\" SLS both sides of bosu w/ ball toss 4x30\" ea SLS w/ bosu ball toss 3x30\" ea SLS w/ bosu ball toss         BOSU w perturbations             Bridges 10x Purple TB            SLS airex w ball toss             Agility ladder             Monster walks    5 laps GTB 4 laps GTB         Turkmen split squats             Ther Ex   " 7/26         Bike/Elliptical  6' 6' 8'         Leg press  3x10 125# 3x10 125# 3x10 125#         SL leg press    2x10 75# ea         SLR 2x15 2#            S/l hip abd 2x15 2#            Prone hamstring curls  Standing 5# 20x ea 10# 2x15ea 7.5# 2x10 ea         Prone ext  3x10 5# ea 2x15 5# ea 2x10 7.5#         LAQ    2x10 7.5#                      Ther Activity  6/6 7/5 7/26         Lateral walks 4 laps purple TB 5 laps PTB 5 laps GTB 4 laps GTB         Squats  3x10 26# KB 3x10 26# KB          Lunges w banded hip resistance             SL RDL  3x10 ea 15# KB 2x15 ea 15# KB 2x15 ea 15# KB                      Gait Training                                       Modalities

## 2024-08-01 ENCOUNTER — OFFICE VISIT (OUTPATIENT)
Dept: FAMILY MEDICINE CLINIC | Facility: CLINIC | Age: 24
End: 2024-08-01
Payer: COMMERCIAL

## 2024-08-01 VITALS
BODY MASS INDEX: 21.57 KG/M2 | DIASTOLIC BLOOD PRESSURE: 62 MMHG | WEIGHT: 117.2 LBS | TEMPERATURE: 98.5 F | RESPIRATION RATE: 14 BRPM | HEART RATE: 69 BPM | SYSTOLIC BLOOD PRESSURE: 100 MMHG | HEIGHT: 62 IN

## 2024-08-01 DIAGNOSIS — F41.9 ANXIETY: ICD-10-CM

## 2024-08-01 DIAGNOSIS — F32.A MILD DEPRESSION: Primary | ICD-10-CM

## 2024-08-01 PROCEDURE — 99214 OFFICE O/P EST MOD 30 MIN: CPT | Performed by: NURSE PRACTITIONER

## 2024-08-01 NOTE — ASSESSMENT & PLAN NOTE
Patient is doing much better with wellbutrin   She will be moving in October she has a 6 month prescription written 7/7/2024. Options would include sending a new prescription October that she can just pay for.   Moving to [place]

## 2024-08-01 NOTE — PROGRESS NOTES
"Ambulatory Visit  Name: Jenaie Renteria      : 2000      MRN: [id number]  Encounter Provider: KENYATTA Rivas  Encounter Date: 2024   Encounter department: [facility] PRIMARY CARE    Assessment & Plan   1. Mild depression  Assessment & Plan:  Doing well with Wellbutrin. No having any SI   Still some mild sleep issues   2. Anxiety  Assessment & Plan:  Patient is doing much better with wellbutrin   She will be moving in October she has a 6 month prescription written 2024. Options would include sending a new prescription October that she can just pay for.   Moving to [place]        History of Present Illness     Patient presents today for follow up on her depression and anxietu. She is overall happy with wellbutrin wants to contiunue the 75mg daily   She is still having some mild sleep issiesmelatonin given her nightmares   She will be moving to [facility] October         Review of Systems   Respiratory: Negative.     Cardiovascular: Negative.    Psychiatric/Behavioral:  Positive for sleep disturbance. Negative for dysphoric mood. The patient is not nervous/anxious.        Objective     /62 (BP Location: Left arm, Patient Position: Sitting, Cuff Size: Adult)   Pulse 69   Temp 98.5 °F (36.9 °C) (Temporal)   Resp 14   Ht 5' 2\" (1.575 m)   Wt 53.2 kg (117 lb 3.2 oz)   BMI 21.44 kg/m²     Physical Exam  Vitals and nursing note reviewed.   Constitutional:       General: She is not in acute distress.     Appearance: Normal appearance. She is normal weight. She is not ill-appearing or diaphoretic.   HENT:      Head: Normocephalic and atraumatic.      Right Ear: External ear normal.      Left Ear: External ear normal.   Eyes:      Extraocular Movements: Extraocular movements intact.      Conjunctiva/sclera: Conjunctivae normal.   Cardiovascular:      Rate and Rhythm: Normal rate and regular rhythm.      Heart sounds: Normal heart sounds, S1 normal and S2 normal. No murmur " heard.  Pulmonary:      Effort: Pulmonary effort is normal.      Breath sounds: Normal breath sounds.   Skin:     Coloration: Skin is not pale.   Neurological:      Mental Status: She is alert and oriented to person, place, and time.   Psychiatric:         Mood and Affect: Mood normal.         Behavior: Behavior normal.         Thought Content: Thought content normal.         Judgment: Judgment normal.       Administrative Statements

## 2024-08-13 ENCOUNTER — OFFICE VISIT (OUTPATIENT)
Dept: OBGYN CLINIC | Facility: CLINIC | Age: 24
End: 2024-08-13
Payer: COMMERCIAL

## 2024-08-13 ENCOUNTER — TELEPHONE (OUTPATIENT)
Age: 24
End: 2024-08-13

## 2024-08-13 VITALS — SYSTOLIC BLOOD PRESSURE: 118 MMHG | WEIGHT: 117 LBS | DIASTOLIC BLOOD PRESSURE: 72 MMHG | BODY MASS INDEX: 21.4 KG/M2

## 2024-08-13 DIAGNOSIS — Z01.419 WOMEN'S ANNUAL ROUTINE GYNECOLOGICAL EXAMINATION: Primary | ICD-10-CM

## 2024-08-13 PROCEDURE — G0145 SCR C/V CYTO,THINLAYER,RESCR: HCPCS | Performed by: OBSTETRICS & GYNECOLOGY

## 2024-08-13 PROCEDURE — S0612 ANNUAL GYNECOLOGICAL EXAMINA: HCPCS | Performed by: OBSTETRICS & GYNECOLOGY

## 2024-08-13 NOTE — TELEPHONE ENCOUNTER
Patient called in stated Dr Noriega advised to schedule with Marga Garg for nexplanon removal and insert. She needs to have done before October 4 because she is moving. Had patient on hold confirmed she would have to come in for consult first since she has not gone to the [facility] office. When I got back on the line no response had to disconnect. Called back reached .

## 2024-08-13 NOTE — PATIENT INSTRUCTIONS
Patient was informed of a stable GYN examination.  A Pap smear was performed.  She declined STD screening.  She will make arrangements for removal and reinsertion of the Nexplanon.  She will arrange with the nurse practitioner.

## 2024-08-13 NOTE — PROGRESS NOTES
Ambulatory Visit  Name: Jeanie Renteria      : 2000      MRN: [id number]  Encounter Provider: Giuseppe Noriega MD  Encounter Date: 2024   Encounter department: OB GYN [facility]    Assessment & Plan   1. Women's annual routine gynecological examination    The patient was informed of a stable GYN examination.  A Pap smear was performed.  She make arrangements for the Nexplanon removal.  She will have it done before she moves in October.  A Pap smear was done today.  She is happy with her weight.  Her anxiety is under control with Wellbutrin.  She declined STD screening.  History of Present Illness     Jeanie Renteria is a 24 y.o. female who presents for annual GYN examination.  She is nulliparous.  She is in a stable relation for the last 4 months.  Things are going well.  She will be relocating to [facility] [place] sometime this .  She is happy with the Nexplanon and she is aware this should be replaced in October this year.  She will make arranges to have it replaced before she moves.  Denies any major gynecological  or GI complaint.  Denies any major  issues.  There is no problem with intimacy.  She is content with her weight she sees a dentist on a regular basis.  There are no major family illnesses to report.  She will need a Pap smear today.  She declined STD screening.    Review of Systems   All other systems reviewed and are negative.      Objective     /72   Wt 53.1 kg (117 lb)   LMP 2024 (Exact Date)   BMI 21.40 kg/m²     Physical Exam  Vitals reviewed. Exam conducted with a chaperone present.   Constitutional:       Appearance: Normal appearance. She is normal weight.   HENT:      Head: Normocephalic and atraumatic.      Nose: Nose normal.   Eyes:      Pupils: Pupils are equal, round, and reactive to light.   Cardiovascular:      Rate and Rhythm: Normal rate and regular rhythm.      Pulses: Normal pulses.      Heart sounds: Normal heart sounds.   Pulmonary:       Effort: Pulmonary effort is normal.      Breath sounds: Normal breath sounds.   Chest:   Breasts:     Breasts are symmetrical.      Right: Normal.      Left: Normal.   Abdominal:      General: Abdomen is flat. Bowel sounds are normal. There is no distension.      Palpations: Abdomen is soft. There is no hepatomegaly, splenomegaly or mass.      Tenderness: There is no abdominal tenderness.      Hernia: No hernia is present. There is no hernia in the left inguinal area or right inguinal area.   Genitourinary:     General: Normal vulva.      Pubic Area: No rash or pubic lice.       Labia:         Right: No rash, tenderness, lesion or injury.         Left: No rash, tenderness, lesion or injury.       Urethra: No prolapse, urethral pain, urethral swelling or urethral lesion.      Vagina: Normal. No signs of injury and foreign body. No vaginal discharge, erythema, tenderness, bleeding, lesions or prolapsed vaginal walls.      Cervix: Normal.      Uterus: Normal.       Adnexa: Right adnexa normal and left adnexa normal.      Rectum: Normal.      Comments: The external genitalia normal limits the vagina is clean the cervix is closed uterus is anterior normal size.  Patient declined STD screening.  A Pap smear was performed.  The adnexa clear bilaterally.  There is no evidence of prolapse.  The urethra and bladder normal working relationship.  Musculoskeletal:         General: Normal range of motion.      Cervical back: Normal range of motion and neck supple.   Lymphadenopathy:      Upper Body:      Right upper body: No supraclavicular or axillary adenopathy.      Left upper body: No supraclavicular or axillary adenopathy.   Skin:     General: Skin is warm and dry.   Neurological:      General: No focal deficit present.      Mental Status: She is alert and oriented to person, place, and time.   Psychiatric:         Mood and Affect: Mood normal.         Behavior: Behavior normal.       Administrative Statements

## 2024-08-20 LAB
LAB AP GYN PRIMARY INTERPRETATION: NORMAL
Lab: NORMAL

## 2024-08-29 DIAGNOSIS — F32.A MILD DEPRESSION: ICD-10-CM

## 2024-08-29 RX ORDER — BUPROPION HYDROCHLORIDE 75 MG/1
75 TABLET ORAL EVERY MORNING
Qty: 90 TABLET | Refills: 0 | Status: SHIPPED | OUTPATIENT
Start: 2024-08-29

## 2024-09-17 ENCOUNTER — PROCEDURE VISIT (OUTPATIENT)
Dept: OBGYN CLINIC | Facility: CLINIC | Age: 24
End: 2024-09-17
Payer: COMMERCIAL

## 2024-09-17 VITALS — WEIGHT: 123 LBS | HEIGHT: 62 IN | BODY MASS INDEX: 22.63 KG/M2

## 2024-09-17 DIAGNOSIS — Z30.46 ENCOUNTER FOR REMOVAL AND REINSERTION OF NEXPLANON: Primary | ICD-10-CM

## 2024-09-17 LAB — SL AMB POCT URINE HCG: NEGATIVE

## 2024-09-17 PROCEDURE — 81025 URINE PREGNANCY TEST: CPT

## 2024-09-17 PROCEDURE — 11983 REMOVE/INSERT DRUG IMPLANT: CPT

## 2024-09-17 NOTE — PROGRESS NOTES
"Universal Protocol:  procedure performed by consultantConsent: Verbal consent obtained. Written consent obtained.  Risks and benefits: risks, benefits and alternatives were discussed  Consent given by: patient  Time out: Immediately prior to procedure a \"time out\" was called to verify the correct patient, procedure, equipment, support staff and site/side marked as required.  Timeout called at: 9/17/2024 9:40 AM.  Patient understanding: patient states understanding of the procedure being performed  Patient consent: the patient's understanding of the procedure matches consent given  Procedure consent: procedure consent matches procedure scheduled  Relevant documents: relevant documents present and verified  Site marked: the operative site was marked  Required items: required blood products, implants, devices, and special equipment available  Patient identity confirmed: verbally with patient  Remove and insert drug implant    Date/Time: 9/17/2024 9:15 AM    Performed by: KENYATTA Nieves  Authorized by: Nkechi Aguila MD    Indication:     Indication: Presence of non-biodegradable drug delivery implant    Pre-procedure:     Pre-procedure timeout performed: yes      Local anesthetic:  Lidocaine 1%    The site was cleaned and prepped in a sterile fashion: yes    Procedure:     Procedure:  Removal with reinsertion    Small stab incision was made in arm: yes      Left/right:  Left    Preloaded contraceptive capsule trocar was placed subdermally: yes      Visualization of implant was obtained: yes      Contraceptive capsule was inserted and trocar removed: yes      Visualization of notch in stylet and palpation of device: yes      Palpation confirms placement by provider and patient: yes      Site was closed with steri-strips and pressure bandage applied: yes    Comments:      Patient is here for Nexplanon removal and reinsertion.   Nexplanon palpated in the left arm.   Skin was prepped in a sterile fashion. "   Area was numbed and small stab incision made.   Nexplanon was easily removed without incidence.   New nexplanon placed in same insertion area.  Aware of side effects and BC coverage.   Aware of need for condoms for STD protection.   Aware of possible paresthesias. Aware of migration risks.   Aware of risk of damage to nerves/vessels.   Pt tolerated procedure well without complaints.   Incision closed with steri strips, wrapped with bandage. Pt aware to keep dry and covered x 24 hours.   Aware of s/sx of infection to call with.   All questions answered.

## 2025-07-08 NOTE — ASSESSMENT & PLAN NOTE
Medication passed protocol.     Medication: Trulicity passed protocol.   Last office visit date: 7/2/2025  Next appointment scheduled?: Yes     Patient doing very well on medication.  Continue lexapro 10mg